# Patient Record
Sex: FEMALE | Race: WHITE | Employment: OTHER | URBAN - METROPOLITAN AREA
[De-identification: names, ages, dates, MRNs, and addresses within clinical notes are randomized per-mention and may not be internally consistent; named-entity substitution may affect disease eponyms.]

---

## 2018-06-11 ENCOUNTER — HOSPITAL ENCOUNTER (OUTPATIENT)
Dept: MAMMOGRAPHY | Age: 68
Discharge: HOME | End: 2018-06-11
Attending: FAMILY MEDICINE
Payer: MEDICARE

## 2018-06-11 DIAGNOSIS — Z12.31 VISIT FOR SCREENING MAMMOGRAM: ICD-10-CM

## 2018-06-11 DIAGNOSIS — E28.39 ESTROGEN DEFICIENCY: ICD-10-CM

## 2018-06-11 PROCEDURE — 77080 DXA BONE DENSITY AXIAL: CPT

## 2018-06-11 PROCEDURE — 77067 SCR MAMMO BI INCL CAD: CPT

## 2018-09-20 ENCOUNTER — HOSPITAL ENCOUNTER (OUTPATIENT)
Dept: MAMMOGRAPHY | Age: 68
Discharge: HOME | End: 2018-09-20
Attending: FAMILY MEDICINE
Payer: MEDICARE

## 2018-09-20 DIAGNOSIS — R92.8 ABNORMAL SCREENING MAMMOGRAM: ICD-10-CM

## 2018-09-20 PROCEDURE — 76642 ULTRASOUND BREAST LIMITED: CPT

## 2019-06-26 ENCOUNTER — HOSPITAL ENCOUNTER (OUTPATIENT)
Dept: CT IMAGING | Age: 69
Discharge: HOME | End: 2019-06-26
Attending: FAMILY MEDICINE
Payer: MEDICARE

## 2019-06-26 DIAGNOSIS — R41.3 MEMORY LOSS: ICD-10-CM

## 2019-06-26 PROCEDURE — 70450 CT HEAD/BRAIN W/O DYE: CPT

## 2019-07-10 PROBLEM — G30.0 EARLY ONSET ALZHEIMER'S DEMENTIA WITHOUT BEHAVIORAL DISTURBANCE (HCC): Status: ACTIVE | Noted: 2019-07-10

## 2019-07-10 PROBLEM — F02.80 EARLY ONSET ALZHEIMER'S DEMENTIA WITHOUT BEHAVIORAL DISTURBANCE (HCC): Status: ACTIVE | Noted: 2019-07-10

## 2019-07-10 PROBLEM — Z79.899 ENCOUNTER FOR MEDICATION MANAGEMENT: Status: ACTIVE | Noted: 2019-07-10

## 2020-02-07 PROBLEM — Z79.899 ENCOUNTER FOR MEDICATION MANAGEMENT: Status: RESOLVED | Noted: 2019-07-10 | Resolved: 2020-02-07

## 2020-10-27 PROBLEM — R21 RASH AND NONSPECIFIC SKIN ERUPTION: Status: ACTIVE | Noted: 2020-10-27

## 2022-03-19 PROBLEM — Z79.899 ENCOUNTER FOR MEDICATION MANAGEMENT: Status: ACTIVE | Noted: 2019-07-10

## 2022-03-19 PROBLEM — R21 RASH AND NONSPECIFIC SKIN ERUPTION: Status: ACTIVE | Noted: 2020-10-27

## 2022-03-19 PROBLEM — F02.80 EARLY ONSET ALZHEIMER'S DEMENTIA WITHOUT BEHAVIORAL DISTURBANCE (HCC): Status: ACTIVE | Noted: 2019-07-10

## 2022-03-19 PROBLEM — G30.0 EARLY ONSET ALZHEIMER'S DEMENTIA WITHOUT BEHAVIORAL DISTURBANCE (HCC): Status: ACTIVE | Noted: 2019-07-10

## 2022-10-05 DIAGNOSIS — F02.80 EARLY ONSET ALZHEIMER'S DEMENTIA WITHOUT BEHAVIORAL DISTURBANCE (HCC): Primary | ICD-10-CM

## 2022-10-05 DIAGNOSIS — G30.0 EARLY ONSET ALZHEIMER'S DEMENTIA WITHOUT BEHAVIORAL DISTURBANCE (HCC): Primary | ICD-10-CM

## 2022-10-06 RX ORDER — DONEPEZIL HYDROCHLORIDE 10 MG/1
10 TABLET, FILM COATED ORAL NIGHTLY
Qty: 90 TABLET | Refills: 2 | Status: SHIPPED | OUTPATIENT
Start: 2022-10-06

## 2022-10-06 RX ORDER — MEMANTINE HYDROCHLORIDE 10 MG/1
10 TABLET ORAL 2 TIMES DAILY
Qty: 180 TABLET | Refills: 2 | Status: SHIPPED | OUTPATIENT
Start: 2022-10-06

## 2022-11-23 ENCOUNTER — OFFICE VISIT (OUTPATIENT)
Dept: FAMILY MEDICINE CLINIC | Facility: CLINIC | Age: 72
End: 2022-11-23
Payer: MEDICARE

## 2022-11-23 VITALS
HEART RATE: 72 BPM | WEIGHT: 115.2 LBS | BODY MASS INDEX: 21.2 KG/M2 | SYSTOLIC BLOOD PRESSURE: 128 MMHG | DIASTOLIC BLOOD PRESSURE: 68 MMHG | OXYGEN SATURATION: 99 % | HEIGHT: 62 IN | RESPIRATION RATE: 18 BRPM

## 2022-11-23 DIAGNOSIS — R51.9 FRONTAL HEADACHE: ICD-10-CM

## 2022-11-23 DIAGNOSIS — G30.0 EARLY ONSET ALZHEIMER'S DEMENTIA WITHOUT BEHAVIORAL DISTURBANCE (HCC): ICD-10-CM

## 2022-11-23 DIAGNOSIS — R10.33 PERIUMBILICAL ABDOMINAL CRAMPING: ICD-10-CM

## 2022-11-23 DIAGNOSIS — R53.83 FATIGUE, UNSPECIFIED TYPE: Primary | ICD-10-CM

## 2022-11-23 DIAGNOSIS — F02.80 EARLY ONSET ALZHEIMER'S DEMENTIA WITHOUT BEHAVIORAL DISTURBANCE (HCC): ICD-10-CM

## 2022-11-23 LAB
25(OH)D3 SERPL-MCNC: 21.5 NG/ML (ref 30–100)
BASOPHILS # BLD: 0.1 K/UL (ref 0–0.2)
BASOPHILS NFR BLD: 1 % (ref 0–2)
BILIRUBIN, URINE, POC: NEGATIVE
BLOOD URINE, POC: NEGATIVE
DIFFERENTIAL METHOD BLD: ABNORMAL
EOSINOPHIL # BLD: 0 K/UL (ref 0–0.8)
EOSINOPHIL NFR BLD: 0 % (ref 0.5–7.8)
ERYTHROCYTE [DISTWIDTH] IN BLOOD BY AUTOMATED COUNT: 12.8 % (ref 11.9–14.6)
ERYTHROCYTE [SEDIMENTATION RATE] IN BLOOD: 2 MM/HR (ref 0–30)
GLUCOSE URINE, POC: NEGATIVE
HCT VFR BLD AUTO: 42.7 % (ref 35.8–46.3)
HGB BLD-MCNC: 13.9 G/DL (ref 11.7–15.4)
IMM GRANULOCYTES # BLD AUTO: 0 K/UL (ref 0–0.5)
IMM GRANULOCYTES NFR BLD AUTO: 0 % (ref 0–5)
KETONES, URINE, POC: NEGATIVE
LEUKOCYTE ESTERASE, URINE, POC: NEGATIVE
LYMPHOCYTES # BLD: 1.8 K/UL (ref 0.5–4.6)
LYMPHOCYTES NFR BLD: 23 % (ref 13–44)
MCH RBC QN AUTO: 33.3 PG (ref 26.1–32.9)
MCHC RBC AUTO-ENTMCNC: 32.6 G/DL (ref 31.4–35)
MCV RBC AUTO: 102.4 FL (ref 82–102)
MONOCYTES # BLD: 0.5 K/UL (ref 0.1–1.3)
MONOCYTES NFR BLD: 7 % (ref 4–12)
NEUTS SEG # BLD: 5.2 K/UL (ref 1.7–8.2)
NEUTS SEG NFR BLD: 69 % (ref 43–78)
NITRITE, URINE, POC: NEGATIVE
NRBC # BLD: 0 K/UL (ref 0–0.2)
PH, URINE, POC: 6 (ref 4.6–8)
PLATELET # BLD AUTO: 277 K/UL (ref 150–450)
PMV BLD AUTO: 9.1 FL (ref 9.4–12.3)
PROTEIN,URINE, POC: NEGATIVE
RBC # BLD AUTO: 4.17 M/UL (ref 4.05–5.2)
SPECIFIC GRAVITY, URINE, POC: 1.02 (ref 1–1.03)
T4 FREE SERPL-MCNC: 1 NG/DL (ref 0.78–1.46)
URINALYSIS CLARITY, POC: CLEAR
URINALYSIS COLOR, POC: YELLOW
UROBILINOGEN, POC: NORMAL
VIT B12 SERPL-MCNC: 598 PG/ML (ref 193–986)
WBC # BLD AUTO: 7.6 K/UL (ref 4.3–11.1)

## 2022-11-23 PROCEDURE — G8484 FLU IMMUNIZE NO ADMIN: HCPCS | Performed by: NURSE PRACTITIONER

## 2022-11-23 PROCEDURE — 1123F ACP DISCUSS/DSCN MKR DOCD: CPT | Performed by: NURSE PRACTITIONER

## 2022-11-23 PROCEDURE — 3017F COLORECTAL CA SCREEN DOC REV: CPT | Performed by: NURSE PRACTITIONER

## 2022-11-23 PROCEDURE — G8399 PT W/DXA RESULTS DOCUMENT: HCPCS | Performed by: NURSE PRACTITIONER

## 2022-11-23 PROCEDURE — 81003 URINALYSIS AUTO W/O SCOPE: CPT | Performed by: NURSE PRACTITIONER

## 2022-11-23 PROCEDURE — 1090F PRES/ABSN URINE INCON ASSESS: CPT | Performed by: NURSE PRACTITIONER

## 2022-11-23 PROCEDURE — G8420 CALC BMI NORM PARAMETERS: HCPCS | Performed by: NURSE PRACTITIONER

## 2022-11-23 PROCEDURE — 1036F TOBACCO NON-USER: CPT | Performed by: NURSE PRACTITIONER

## 2022-11-23 PROCEDURE — 99214 OFFICE O/P EST MOD 30 MIN: CPT | Performed by: NURSE PRACTITIONER

## 2022-11-23 PROCEDURE — G8427 DOCREV CUR MEDS BY ELIG CLIN: HCPCS | Performed by: NURSE PRACTITIONER

## 2022-11-23 RX ORDER — DICYCLOMINE HYDROCHLORIDE 10 MG/1
10 CAPSULE ORAL 3 TIMES DAILY PRN
Qty: 30 CAPSULE | Refills: 2 | Status: SHIPPED | OUTPATIENT
Start: 2022-11-23

## 2022-11-23 ASSESSMENT — ENCOUNTER SYMPTOMS
FACIAL SWELLING: 0
WHEEZING: 0
EYE PAIN: 0
EYE REDNESS: 0
SINUS PRESSURE: 0
DIARRHEA: 0
ABDOMINAL DISTENTION: 0
ALLERGIC/IMMUNOLOGIC NEGATIVE: 1
VOMITING: 0
CHEST TIGHTNESS: 0
EYES NEGATIVE: 1
APNEA: 0
COUGH: 0
CHOKING: 0
BACK PAIN: 0
CONSTIPATION: 0
RECTAL PAIN: 0
SHORTNESS OF BREATH: 0
STRIDOR: 0
EYE DISCHARGE: 0
COLOR CHANGE: 0
SINUS PAIN: 0
NAUSEA: 0
VOICE CHANGE: 0
SORE THROAT: 0
RESPIRATORY NEGATIVE: 1
ANAL BLEEDING: 0
PHOTOPHOBIA: 0
RHINORRHEA: 0
EYE ITCHING: 0
TROUBLE SWALLOWING: 0
BLOOD IN STOOL: 0

## 2022-11-23 NOTE — PROGRESS NOTES
[address]  Phone: [phone] Fax [phone]  Donnell Glover. Iris MS, APRN, FNP-C  11/23/22  Chief Complaint   Patient presents with    Fatigue     Pt has been lost to f/u with her PCP-Dr. King Gross since 10/28/20. Pt here today accompanied by her daughter and  who help take care of her. Pt has Alzheimer's disease. On Aricept and Namenda and under care of Neurology. 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 Reports pt has been having some ongoing fatigue for several weeks. Denies pt having any associated fever, chest pain or pressure, SOB, wheezing, cough. Abdominal Cramping     Also reports pt has been having ongoing periumbilical abdominal cramping for several weeks. Worse after pt eats. Denies pt having any N/V/D/loose stools, constipation, heartburn, reflux, fevers associated. Reports pt has good appetite. Denies any associated urinary symptoms. Last colonoscopy was 2013-due for repeat in near future. Headache     Also reports pt having ongoing issue with frequent frontal headaches for several months. Over past few weeks have been occurring several days a week. Takes PRN OTC Ibuprofen with relief per pt's . Denies pt having any blurred vision/photophobia, focal weakness or neuro defs, sinus/URI/allergy symptoms, or elevated BP associated. ASSESSMENT/PLAN:  Below is the assessment and plan developed based on review of pertinent history, physical exam, labs, studies, and medications. 1. Fatigue, unspecified type  Discussed with pt, daughter, . Complaints of fatigue vague. Will check CBC, CMP, TFT's, Vitamin B12, and Vitamin D level to start and address any abnormalities found. Pt to f/u in 2-3 weeks with her PCP-Dr. King Gross to re-establish care/recheck.  If labs unremarkable, Dr. King Gross may want to consider further workup, but fatigue may be related to pt's Alzheimer's disease.   - CBC with Auto Differential; Future  - Comprehensive Metabolic Panel; Future  - TSH; Future  - T4, Free; Future  - Vitamin B12; Future  - Vitamin D 25 Hydroxy; Future    2. Periumbilical abdominal cramping  Discussed with pt, daughter, . Abdomen benign on physical exam today. UA negative in office today and pt not having any urinary symptoms so doubt UTI. No abx unless urine culture grows something. Symptoms c/w possible IBS. Will have pt's  help pt keep food diary and have pt avoid trigger foods that may be found. Will have pt follow IBS diet. Will give PRN Bentyl as directed to hep with the periumbilical abdominal cramping. Will check CBC and CMP today. Will refer pt to GI for further evaluation and to discuss repeat colonoscopy. Pt to f/u in 2-3 weeks with her PCP-Dr. King Gross to re-establish care/recheck. - CBC with Auto Differential; Future  - Comprehensive Metabolic Panel; Future  - AMB POC URINALYSIS DIP STICK AUTO W/O MICRO  - Culture, Urine; Future  - Breanna Rodriguez MD, Gastroenterology, [place]  - dicyclomine (BENTYL) 10 MG capsule; Take 1 capsule by mouth 3 times daily as needed (periumbilcal cramping)  Dispense: 30 capsule; Refill: 2    3. Frontal headache  Discussed with pt, daughter, . BP WNL today 128/68. Etiology of headaches unclear, but since they are occurring more frequently, will check sed rate and order non-contrast head CT. Pt's  given number to radiology to schedule. Will have pt continue PRN OTC Ibuprofen as directed to help. Pt to f/u in 2-3 weeks with her PCP-Dr. King Gross to re-establish care/recheck. Pt's  encouraged to call Neurology to make an appointment to discuss frequent headaches as well. - Sedimentation Rate; Future  - CT HEAD WO CONTRAST; Future    4. Early onset Alzheimer's dementia without behavioral disturbance [facility])  Discussed with pt, daughter, .  Will have pt continue POC/medications for Alzheimer's disease per Neurology. Return for F/u 2-3 weeks with Dr. St Mention. Call sooner if worse. .  ER for severe symptoms as discussed. SUBJECTIVE/OBJECTIVE:    JOSH-  Andrew Marquis (: 1950) is a 81 y.o. female, Established patient patient, here for evaluation of the following chief complaint(s):  Fatigue (Pt has been lost to f/u with her PCP-Dr. St Mention since 10/28/20. Pt here today accompanied by her daughter and  who help take care of her. Pt has Alzheimer's disease. On Aricept and Namenda and under care of Neurology. 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 Reports pt has been having some ongoing fatigue for several weeks. Denies pt having any associated fever, chest pain or pressure, SOB, wheezing, cough. ), Abdominal Cramping (Also reports pt has been having ongoing periumbilical abdominal cramping for several weeks. Worse after pt eats. Denies pt having any N/V/D/loose stools, constipation, heartburn, reflux, fevers associated. Reports pt has good appetite. Denies any associated urinary symptoms. Last colonoscopy was -due for repeat in near future. ), and Headache (Also reports pt having ongoing issue with frequent frontal headaches for several months. Over past few weeks have been occurring several days a week. Takes PRN OTC Ibuprofen with relief per pt's .  Denies pt having any blurred vision/photophobia, focal weakness or neuro defs, sinus/URI/allergy symptoms, or elevated BP associated. )     No Known Allergies  [unfilled]  Past Medical History:   Diagnosis Date    H/O seasonal allergies      Past Surgical History:   Procedure Laterality Date    BREAST SURGERY      benign breast lump    HYSTERECTOMY (CERVIX STATUS UNKNOWN)      LSH/BSO    GENIA BIOPSY BREAST STEREOTACTIC Right 2004     Family History   Problem Relation Age of Onset    Hypertension Father 61    Elevated Lipids Father     Breast Cancer Neg Hx     Hypertension Mother     Elevated Lipids Mother     Elevated Lipids Maternal Grandmother      Social History     Tobacco Use   Smoking Status Never   Smokeless Tobacco Never         Review of Systems   Constitutional:  Positive for fatigue (see HPI). Negative for appetite change, chills, diaphoresis, fever and unexpected weight change. HENT: Negative. Negative for congestion, dental problem, drooling, ear discharge, ear pain, facial swelling, hearing loss, mouth sores, nosebleeds, postnasal drip, rhinorrhea, sinus pressure, sinus pain, sneezing, sore throat, tinnitus, trouble swallowing and voice change. Eyes: Negative. Negative for photophobia, pain, discharge, redness, itching and visual disturbance. Respiratory: Negative. Negative for apnea, cough, choking, chest tightness, shortness of breath, wheezing and stridor. Cardiovascular: Negative. Negative for chest pain, palpitations and leg swelling. Gastrointestinal:  Positive for abdominal pain (see HPI). Negative for abdominal distention, anal bleeding, blood in stool, constipation, diarrhea, nausea, rectal pain and vomiting. Endocrine: Negative. Negative for cold intolerance, heat intolerance, polydipsia, polyphagia and polyuria. Genitourinary: Negative. Negative for decreased urine volume, difficulty urinating, dyspareunia, dysuria, flank pain, frequency, genital sores, hematuria, menstrual problem, pelvic pain, urgency, vaginal bleeding, vaginal discharge and vaginal pain. Musculoskeletal: Negative. Negative for arthralgias, back pain, gait problem, joint swelling, myalgias, neck pain and neck stiffness. Skin: Negative. Negative for color change, pallor, rash and wound. Allergic/Immunologic: Negative. Negative for environmental allergies. Neurological:  Positive for headaches (see HPI). Negative for dizziness, tremors, seizures, syncope, facial asymmetry, speech difficulty, weakness, light-headedness and numbness. Hematological: Negative. Negative for adenopathy. Does not bruise/bleed easily. Psychiatric/Behavioral:  Positive for confusion (Alzheimer's Disease-STM loss-see HPI). Negative for agitation, behavioral problems, decreased concentration, dysphoric mood, hallucinations, self-injury, sleep disturbance and suicidal ideas. The patient is not nervous/anxious and is not hyperactive. Vitals:    11/23/22 0904   BP: 128/68   Pulse: 72   Resp: 18   SpO2: 99%       Physical Exam  Vitals reviewed. Constitutional:       General: She is not in acute distress. Appearance: Normal appearance. She is normal weight. She is not ill-appearing, toxic-appearing or diaphoretic. HENT:      Head: Normocephalic and atraumatic. Right Ear: Tympanic membrane, ear canal and external ear normal. There is no impacted cerumen. Left Ear: Tympanic membrane, ear canal and external ear normal. There is no impacted cerumen. Nose: Nose normal. No congestion or rhinorrhea. Mouth/Throat:      Mouth: Mucous membranes are moist.      Pharynx: Oropharynx is clear. No oropharyngeal exudate or posterior oropharyngeal erythema. Eyes:      General: No scleral icterus. Right eye: No discharge. Left eye: No discharge. Extraocular Movements: Extraocular movements intact. Conjunctiva/sclera: Conjunctivae normal.      Pupils: Pupils are equal, round, and reactive to light. Cardiovascular:      Rate and Rhythm: Normal rate and regular rhythm. Pulses: Normal pulses. Heart sounds: Normal heart sounds. No murmur heard. No friction rub. No gallop. Pulmonary:      Effort: Pulmonary effort is normal. No respiratory distress. Breath sounds: Normal breath sounds. No stridor. No wheezing, rhonchi or rales. Chest:      Chest wall: No tenderness. Abdominal:      General: Abdomen is flat. Bowel sounds are normal. There is no distension. Palpations: Abdomen is soft. There is no mass. Tenderness: There is no abdominal tenderness. There is no right CVA tenderness, left CVA tenderness, guarding or rebound. Hernia: No hernia is present. Musculoskeletal:         General: No swelling, tenderness, deformity or signs of injury. Normal range of motion. Cervical back: Normal range of motion and neck supple. No rigidity or tenderness. Right lower leg: No edema. Left lower leg: No edema. Comments: Gait steady and unassisted   Lymphadenopathy:      Cervical: No cervical adenopathy. Skin:     General: Skin is warm. Capillary Refill: Capillary refill takes less than 2 seconds. Coloration: Skin is not jaundiced or pale. Findings: No bruising, erythema, lesion or rash. Neurological:      General: No focal deficit present. Mental Status: She is alert. Cranial Nerves: No cranial nerve deficit. Sensory: No sensory deficit. Motor: No weakness. Coordination: Coordination normal.      Gait: Gait normal.      Comments: Mildly confused to time, but oriented to person and place. Easily redirected. Normal mood, behavior, responses   Psychiatric:         Mood and Affect: Mood normal.         Behavior: Behavior normal.         Thought Content:  Thought content normal.         Judgment: Judgment normal.     Component      Latest Ref Rng & Units 11/23/2022           9:32 AM   Color, Urine, POC       yellow   Clarity, Urine, POC       clear   Glucose, Urine, POC      Negative Negative   Bilirubin, Urine, POC      Negative Negative   Ketones, Urine, POC      Negative Negative   Specific Gravity, Urine, POC      1.001 - 1.035 1.025   Blood, Urine, POC      Negative negative   pH, Urine, POC      4.6 - 8.0 6.0   Protein, Urine, POC      Negative Negative   Urobilinogen, POC       normal   Nitrate, Urine, POC      Negative negative   Leukocyte Esterase, Urine, POC      Negative Negative     PLEASE NOTE:  This document has been produced using voice recognition software. Unrecognized errors in transcription may be present. On this date 11/23/2022 I have spent 30 minutes reviewing previous notes, test results and face to face with the patient discussing the diagnosis and importance of compliance with the treatment plan as well as documenting on the day of the visit. An electronic signature was used to authenticate this note.   -- Pramod Young, DEREK - NP

## 2022-11-24 LAB
ALBUMIN SERPL-MCNC: 4.1 G/DL (ref 3.2–4.6)
ALBUMIN/GLOB SERPL: 1.4 {RATIO} (ref 0.4–1.6)
ALP SERPL-CCNC: 104 U/L (ref 50–136)
ALT SERPL-CCNC: 50 U/L (ref 12–65)
ANION GAP SERPL CALC-SCNC: 4 MMOL/L (ref 2–11)
AST SERPL-CCNC: 32 U/L (ref 15–37)
BILIRUB SERPL-MCNC: 0.4 MG/DL (ref 0.2–1.1)
BUN SERPL-MCNC: 17 MG/DL (ref 8–23)
CALCIUM SERPL-MCNC: 10 MG/DL (ref 8.3–10.4)
CHLORIDE SERPL-SCNC: 108 MMOL/L (ref 101–110)
CO2 SERPL-SCNC: 29 MMOL/L (ref 21–32)
CREAT SERPL-MCNC: 0.6 MG/DL (ref 0.6–1)
GLOBULIN SER CALC-MCNC: 2.9 G/DL (ref 2.8–4.5)
GLUCOSE SERPL-MCNC: 91 MG/DL (ref 65–100)
POTASSIUM SERPL-SCNC: 4 MMOL/L (ref 3.5–5.1)
PROT SERPL-MCNC: 7 G/DL (ref 6.3–8.2)
SODIUM SERPL-SCNC: 141 MMOL/L (ref 133–143)
TSH, 3RD GENERATION: 1.51 UIU/ML (ref 0.36–3.74)

## 2022-11-25 DIAGNOSIS — E55.9 VITAMIN D INSUFFICIENCY: Primary | ICD-10-CM

## 2022-11-25 RX ORDER — CHOLECALCIFEROL (VITAMIN D3) 50 MCG
2000 TABLET ORAL DAILY
Qty: 30 TABLET | Refills: 2 | Status: SHIPPED | OUTPATIENT
Start: 2022-11-25

## 2022-11-25 ASSESSMENT — ENCOUNTER SYMPTOMS: ABDOMINAL PAIN: 1

## 2022-11-26 LAB
BACTERIA SPEC CULT: NORMAL
BACTERIA SPEC CULT: NORMAL
SERVICE CMNT-IMP: NORMAL

## 2022-12-07 ENCOUNTER — HOSPITAL ENCOUNTER (OUTPATIENT)
Dept: CT IMAGING | Age: 72
Discharge: HOME | End: 2022-12-10

## 2022-12-07 DIAGNOSIS — R51.9 FRONTAL HEADACHE: ICD-10-CM

## 2022-12-14 ENCOUNTER — OFFICE VISIT (OUTPATIENT)
Dept: FAMILY MEDICINE CLINIC | Facility: CLINIC | Age: 72
End: 2022-12-14
Payer: MEDICARE

## 2022-12-14 VITALS
SYSTOLIC BLOOD PRESSURE: 108 MMHG | HEART RATE: 70 BPM | TEMPERATURE: 98.3 F | DIASTOLIC BLOOD PRESSURE: 70 MMHG | OXYGEN SATURATION: 100 % | BODY MASS INDEX: 20.65 KG/M2 | HEIGHT: 62 IN | WEIGHT: 112.2 LBS

## 2022-12-14 DIAGNOSIS — E55.9 VITAMIN D DEFICIENCY: ICD-10-CM

## 2022-12-14 DIAGNOSIS — E55.9 VITAMIN D INSUFFICIENCY: ICD-10-CM

## 2022-12-14 DIAGNOSIS — R10.84 GENERALIZED ABDOMINAL PAIN: Primary | ICD-10-CM

## 2022-12-14 DIAGNOSIS — R53.83 FATIGUE, UNSPECIFIED TYPE: ICD-10-CM

## 2022-12-14 DIAGNOSIS — G30.0 EARLY ONSET ALZHEIMER'S DEMENTIA WITHOUT BEHAVIORAL DISTURBANCE (HCC): ICD-10-CM

## 2022-12-14 DIAGNOSIS — F02.80 EARLY ONSET ALZHEIMER'S DEMENTIA WITHOUT BEHAVIORAL DISTURBANCE (HCC): ICD-10-CM

## 2022-12-14 DIAGNOSIS — R51.9 HEADACHE, UNSPECIFIED HEADACHE TYPE: ICD-10-CM

## 2022-12-14 PROCEDURE — G8420 CALC BMI NORM PARAMETERS: HCPCS | Performed by: FAMILY MEDICINE

## 2022-12-14 PROCEDURE — 3017F COLORECTAL CA SCREEN DOC REV: CPT | Performed by: FAMILY MEDICINE

## 2022-12-14 PROCEDURE — 99215 OFFICE O/P EST HI 40 MIN: CPT | Performed by: FAMILY MEDICINE

## 2022-12-14 PROCEDURE — G8399 PT W/DXA RESULTS DOCUMENT: HCPCS | Performed by: FAMILY MEDICINE

## 2022-12-14 PROCEDURE — 1036F TOBACCO NON-USER: CPT | Performed by: FAMILY MEDICINE

## 2022-12-14 PROCEDURE — G8427 DOCREV CUR MEDS BY ELIG CLIN: HCPCS | Performed by: FAMILY MEDICINE

## 2022-12-14 PROCEDURE — 1090F PRES/ABSN URINE INCON ASSESS: CPT | Performed by: FAMILY MEDICINE

## 2022-12-14 PROCEDURE — 1123F ACP DISCUSS/DSCN MKR DOCD: CPT | Performed by: FAMILY MEDICINE

## 2022-12-14 PROCEDURE — G8484 FLU IMMUNIZE NO ADMIN: HCPCS | Performed by: FAMILY MEDICINE

## 2022-12-14 RX ORDER — CHOLECALCIFEROL (VITAMIN D3) 50 MCG
TABLET ORAL
Qty: 60 TABLET | Refills: 5 | Status: SHIPPED | OUTPATIENT
Start: 2022-12-14

## 2022-12-14 ASSESSMENT — PATIENT HEALTH QUESTIONNAIRE - PHQ9
1. LITTLE INTEREST OR PLEASURE IN DOING THINGS: 0
SUM OF ALL RESPONSES TO PHQ QUESTIONS 1-9: 0
SUM OF ALL RESPONSES TO PHQ QUESTIONS 1-9: 0
2. FEELING DOWN, DEPRESSED OR HOPELESS: 0
SUM OF ALL RESPONSES TO PHQ9 QUESTIONS 1 & 2: 0
SUM OF ALL RESPONSES TO PHQ QUESTIONS 1-9: 0
SUM OF ALL RESPONSES TO PHQ QUESTIONS 1-9: 0

## 2022-12-14 NOTE — PROGRESS NOTES
SUBJECTIVE:   Alisha Caballero is a 81 y.o. female who has a past medical history significant for Alzheimer's and vitamin D deficiency. I have not seen the patient in roughly 2 years. She saw our nurse practitioner in my absence recently. She was complaining of a 3-month history of generalized lower abdominal discomfort and bloating. A urinalysis and urine culture were negative. A CBC and metabolic panel were unremarkable. She was placed on Bentyl and referred to GI. She has been scheduled for a colonoscopy. Symptoms have subsided some. No reports of melena or hematochezia. No unexplained weight loss reported. No nausea or vomiting and no dysphagia or reflux symptoms. In addition she was complaining of a 3-month history of intermittent frontal headaches. No associated photophobia or nausea. Some sinus involvement reported and some tension/stress reported. No visual disturbance. No slurred speech. In the last 2 weeks these headaches have subsided. The nurse practitioner ordered a CT scan that revealed no acute findings. In addition the patient reported ongoing struggles with low energy and fatigue. No sleep disturbance reported. No chest pain, shortness of breath, orthopnea or PND. CBC, TSH, S07 and metabolic panel unremarkable. Vitamin D level mildly low at 21. HPI  See above    Past Medical History, Past Surgical History, Family history, Social History, and Medications were all reviewed with the patient today and updated as necessary.        Current Outpatient Medications   Medication Sig Dispense Refill    vitamin D (CHOLECALCIFEROL) 50 MCG (2000 UT) TABS tablet Take 1 tablet by mouth daily 30 tablet 2    dicyclomine (BENTYL) 10 MG capsule Take 1 capsule by mouth 3 times daily as needed (periumbilcal cramping) 30 capsule 2    donepezil (ARICEPT) 10 MG tablet Take 1 tablet by mouth nightly (Patient taking differently: Take 10 mg by mouth 2 times daily) 90 tablet 2    memantine (NAMENDA) 10 MG tablet Take 1 tablet by mouth 2 times daily 180 tablet 2     No current facility-administered medications for this visit. No Known Allergies  Patient Active Problem List   Diagnosis    Early onset Alzheimer's dementia without behavioral disturbance ([facility] [address]    Encounter for medication management    H/O seasonal allergies    Rash and nonspecific skin eruption    Vitamin D deficiency     Past Medical History:   Diagnosis Date    H/O seasonal allergies      Past Surgical History:   Procedure Laterality Date    BREAST SURGERY      benign breast lump    HYSTERECTOMY (CERVIX STATUS UNKNOWN)  2006    LSH/BSO    GENIA BIOPSY BREAST STEREOTACTIC Right 2004     Family History   Problem Relation Age of Onset    Hypertension Father 61    Elevated Lipids Father     Breast Cancer Neg Hx     Hypertension Mother     Elevated Lipids Mother     Elevated Lipids Maternal Grandmother      Social History     Tobacco Use    Smoking status: Never    Smokeless tobacco: Never   Substance Use Topics    Alcohol use: Yes     Alcohol/week: 7.0 standard drinks         Review of Systems  See above    OBJECTIVE:  /70   Pulse 70   Temp 98.3 °F (36.8 °C) (Oral)   Ht 5' 2\" (1.575 m)   Wt 112 lb 3.2 oz (50.9 kg)   SpO2 100%   BMI 20.52 kg/m²      Physical Exam  Constitutional:       General: She is not in acute distress. Appearance: Normal appearance. She is not ill-appearing. HENT:      Head: Normocephalic and atraumatic. Ears:      Comments: TMs are slightly dull bilaterally with clear effusions. Cardiovascular:      Rate and Rhythm: Normal rate and regular rhythm. Heart sounds: Normal heart sounds. No murmur heard. Pulmonary:      Effort: Pulmonary effort is normal.      Breath sounds: Normal breath sounds. No wheezing or rhonchi. Musculoskeletal:         General: Normal range of motion. Cervical back: Normal range of motion and neck supple. Right lower leg: No edema.       Left lower leg: No edema.   Skin:     General: Skin is warm. Findings: No rash. Neurological:      Mental Status: She is alert. Comments: Neurological exam is nonfocal.  Fundi benign. Psychiatric:         Mood and Affect: Mood normal.         Behavior: Behavior normal.         Thought Content: Thought content normal.         Judgment: Judgment normal.       Medical problems and test results were reviewed with the patient today. ASSESSMENT and PLAN    1. Lower abdominal pain. Is in the process of being evaluated by GI. Symptoms of actually improved some. Work-up thus far negative including labs. Follow-up as needed after GI work-up completed. 2.  Headaches. Seem to have resolved. Neuro imaging negative. Possible tension component versus allergic component. If recurs trial of over-the-counter plain Mucinex and Nasacort. 3.  Fatigue. Multifactorial.  Could be related to her Namenda and Aricept use. Could be related to her Alzheimer's. Vitamin D deficiency contributing as well. Will increase supplementation from 1 a day to 2 a day. Reevaluate at follow-up. 4.  Vitamin D deficiency. Increase supplementation from 1 a day to 2 a day. 5.  Alzheimer's. Per neurology. Elements of this note have been dictated using speech recognition software. As a result, errors of speech recognition may have occurred.

## 2023-03-13 ENCOUNTER — OFFICE VISIT (OUTPATIENT)
Dept: NEUROLOGY | Age: 73
End: 2023-03-13
Payer: MEDICARE

## 2023-03-13 VITALS
HEART RATE: 69 BPM | WEIGHT: 108 LBS | BODY MASS INDEX: 19.75 KG/M2 | SYSTOLIC BLOOD PRESSURE: 118 MMHG | DIASTOLIC BLOOD PRESSURE: 80 MMHG

## 2023-03-13 DIAGNOSIS — G30.0 EARLY ONSET ALZHEIMER'S DEMENTIA WITHOUT BEHAVIORAL DISTURBANCE (HCC): Primary | ICD-10-CM

## 2023-03-13 DIAGNOSIS — Z79.899 ENCOUNTER FOR MEDICATION MANAGEMENT: ICD-10-CM

## 2023-03-13 DIAGNOSIS — F02.80 EARLY ONSET ALZHEIMER'S DEMENTIA WITHOUT BEHAVIORAL DISTURBANCE (HCC): Primary | ICD-10-CM

## 2023-03-13 PROCEDURE — 1090F PRES/ABSN URINE INCON ASSESS: CPT | Performed by: PSYCHIATRY & NEUROLOGY

## 2023-03-13 PROCEDURE — 1036F TOBACCO NON-USER: CPT | Performed by: PSYCHIATRY & NEUROLOGY

## 2023-03-13 PROCEDURE — G8484 FLU IMMUNIZE NO ADMIN: HCPCS | Performed by: PSYCHIATRY & NEUROLOGY

## 2023-03-13 PROCEDURE — 99215 OFFICE O/P EST HI 40 MIN: CPT | Performed by: PSYCHIATRY & NEUROLOGY

## 2023-03-13 PROCEDURE — G8420 CALC BMI NORM PARAMETERS: HCPCS | Performed by: PSYCHIATRY & NEUROLOGY

## 2023-03-13 PROCEDURE — 3017F COLORECTAL CA SCREEN DOC REV: CPT | Performed by: PSYCHIATRY & NEUROLOGY

## 2023-03-13 PROCEDURE — G8399 PT W/DXA RESULTS DOCUMENT: HCPCS | Performed by: PSYCHIATRY & NEUROLOGY

## 2023-03-13 PROCEDURE — 1123F ACP DISCUSS/DSCN MKR DOCD: CPT | Performed by: PSYCHIATRY & NEUROLOGY

## 2023-03-13 PROCEDURE — G8427 DOCREV CUR MEDS BY ELIG CLIN: HCPCS | Performed by: PSYCHIATRY & NEUROLOGY

## 2023-03-13 ASSESSMENT — ENCOUNTER SYMPTOMS
EYE REDNESS: 0
DOUBLE VISION: 0
BACK PAIN: 1
BLURRED VISION: 0
PHOTOPHOBIA: 1
RESPIRATORY NEGATIVE: 1
GASTROINTESTINAL NEGATIVE: 1
EYE PAIN: 0
EYE DISCHARGE: 0

## 2023-03-13 ASSESSMENT — VISUAL ACUITY: OU: 1

## 2023-03-13 NOTE — PROGRESS NOTES
NEUROLOGY  RETURN  OFFICE VISIT [de-identified]                     3/13/2023  Cj Domingo is a 81 y.o. female here for [de-identified] Neurologic follow-up  // dementia. Referred by   Clemente Canales MD       Chief Complaint:   Dementia. Memory loss. Slowly progressive AD. She is on double drug therapy. 72-year-old woman accompanied  by her daughter and her . No particular changes. Continues with a slowly progressive memory disturbance dementia. Namenda at 10 mg twice daily. Aricept at 10 mg twice daily. Active Problems:    * No active hospital problems. *  Resolved Problems:    * No resolved hospital problems. *    Past Medical History:   Diagnosis Date    H/O seasonal allergies      Past Surgical History:   Procedure Laterality Date    BREAST SURGERY      benign breast lump    HYSTERECTOMY (CERVIX STATUS UNKNOWN)  2006    LSH/BSO    GENIA BIOPSY BREAST STEREOTACTIC Right 2004      Family History   Problem Relation Age of Onset    Hypertension Father [address]    Elevated Lipids Father     Breast Cancer Neg Hx     Hypertension Mother     Elevated Lipids Mother     Elevated Lipids Maternal Grandmother      Social History     Socioeconomic History    Marital status:      Spouse name: None    Number of children: None    Years of education: None    Highest education level: None   Tobacco Use    Smoking status: Never    Smokeless tobacco: Never   Substance and Sexual Activity    Alcohol use:  Yes     Alcohol/week: 7.0 standard drinks        Current Outpatient Medications   Medication Sig Dispense Refill    vitamin D (CHOLECALCIFEROL) 50 MCG (2000 UT) TABS tablet Take 1 tablet twice a day 60 tablet 5    dicyclomine (BENTYL) 10 MG capsule Take 1 capsule by mouth 3 times daily as needed (periumbilcal cramping) 30 capsule 2    donepezil (ARICEPT) 10 MG tablet Take 1 tablet by mouth nightly (Patient taking differently: Take 10 mg by mouth 2 times daily) 90 tablet 2    memantine (NAMENDA) 10 MG tablet Take 1 tablet by mouth 2 times daily 180 tablet 2     No current facility-administered medications for this visit. No Known Allergies    REVIEW OTHER RECORDS:    Review of Systems   Constitutional:  Positive for malaise/fatigue and weight loss. Negative for chills, diaphoresis and fever. HENT: Negative. Eyes:  Positive for photophobia. Negative for blurred vision, double vision, pain, discharge and redness. Respiratory: Negative. Cardiovascular: Negative. Gastrointestinal: Negative. Genitourinary: Negative. Musculoskeletal:  Positive for back pain. Negative for falls, joint pain, myalgias and neck pain. Skin: Negative. Neurological:  Positive for headaches. Negative for dizziness, tingling, tremors, sensory change, speech change, focal weakness, seizures, loss of consciousness and weakness. Gradual memory loss. Endo/Heme/Allergies: Negative. Psychiatric/Behavioral:  Positive for memory loss. Negative for depression, hallucinations, substance abuse and suicidal ideas. The patient is nervous/anxious. The patient does not have insomnia. All other systems reviewed and are negative. REVIEW IMAGING: none    Objective:     Vitals:    03/13/23 1158   BP: 118/80   Site: Right Upper Arm   Position: Sitting   Pulse: 69   Weight: 108 lb (49 kg)        Physical Exam  Vitals reviewed. Constitutional:       General: She is awake. She is not in acute distress. Appearance: She is well-developed and well-groomed. She is not ill-appearing, toxic-appearing or diaphoretic. HENT:      Head: Normocephalic and atraumatic. Right Ear: Hearing normal.      Left Ear: Hearing normal.   Eyes:      General: Lids are normal. Vision grossly intact. No scleral icterus. Right eye: No discharge. Left eye: No discharge. Extraocular Movements: Extraocular movements intact.       Conjunctiva/sclera: Conjunctivae normal.      Pupils: Pupils are equal, round, and reactive to light.   Neck:      Trachea: Phonation normal.   Pulmonary:      Effort: Pulmonary effort is normal. No respiratory distress. Breath sounds: No wheezing. Musculoskeletal:         General: No swelling, tenderness, deformity or signs of injury. Cervical back: Normal range of motion and neck supple. Right lower leg: No edema. Left lower leg: No edema. Skin:     Coloration: Skin is not cyanotic, jaundiced or pale. Nails: There is no clubbing. Neurological:      Mental Status: She is alert and easily aroused. Mental status is at baseline. Cranial Nerves: Cranial nerves 2-12 are intact. No cranial nerve deficit, dysarthria or facial asymmetry. Motor: No weakness, tremor, atrophy or seizure activity. Coordination: Coordination normal.      Gait: Gait normal.   Psychiatric:         Attention and Perception: Attention normal.         Mood and Affect: Mood normal.         Speech: Speech normal.         Behavior: Behavior normal. Behavior is cooperative. Neurologic Exam     Mental Status   Speech: speech is normal   Level of consciousness: alert  Normal comprehension. Cranial Nerves   Cranial nerves II through XII intact. CN III, IV, VI   Pupils are equal, round, and reactive to light. Gait, Coordination, and Reflexes     Tremor   Resting tremor: absent  Intention tremor: absent  Action tremor: absent  There is no tic, twitch, tonic or clonic activity noted. No dyskinesia. Assessment & Plan     Boykin Burkitt was seen today for follow-up. Diagnoses and all orders for this visit:    Early onset Alzheimer's dementia without behavioral disturbance ([facility] [address]    Encounter for medication management    Continues as expected with decline in memory and forgetfulness increasing. Continue double drug therapy. Had a full discussion and we can try to line her up for second opinion if they wish.     All drugs and rx reviewed fully with patient and acknowledged specific to neurology as well. Differential diagnostic decisions and thoughts reviewed and discussed. Updating to patient identification, coordinate neurology visits, reasons for follow, review neurologic problems. Extensive time[de-identified]  Total time  40 minutes -       More than 50% of this visit was spent in counseling and care coordination. Treatment options fully reviewed with patient. Time includes pre-  and post- face-face time in records review, and preparation including available pertinent images and reports. Acknowledgements obtained where needed.    [ *NOTE: parts of this note were produced using artificial speech recognition software, which may have inadvertent errors in the produced wordings. ]          Alfonzo Guevara MD  Consultative Neurology, [address] [facility] [place]  Phone:  [phone]  Fax:   [phone]        Signed By: Alfonzo Guevara MD     March 13, 2023

## 2023-03-15 ENCOUNTER — TELEPHONE (OUTPATIENT)
Dept: NEUROLOGY | Age: 73
End: 2023-03-15

## 2023-03-15 DIAGNOSIS — R41.3 MEMORY LOSS OR IMPAIRMENT: Primary | ICD-10-CM

## 2023-04-09 DIAGNOSIS — G30.0 EARLY ONSET ALZHEIMER'S DEMENTIA WITHOUT BEHAVIORAL DISTURBANCE (HCC): Primary | ICD-10-CM

## 2023-04-09 DIAGNOSIS — F02.80 EARLY ONSET ALZHEIMER'S DEMENTIA WITHOUT BEHAVIORAL DISTURBANCE (HCC): Primary | ICD-10-CM

## 2023-04-11 RX ORDER — DONEPEZIL HYDROCHLORIDE 10 MG/1
TABLET, FILM COATED ORAL
Qty: 90 TABLET | Refills: 2 | OUTPATIENT
Start: 2023-04-11

## 2023-04-11 RX ORDER — DONEPEZIL HYDROCHLORIDE 10 MG/1
10 TABLET, FILM COATED ORAL NIGHTLY
Qty: 90 TABLET | Refills: 2 | Status: SHIPPED | OUTPATIENT
Start: 2023-04-11 | End: 2023-04-11

## 2023-06-19 ENCOUNTER — OFFICE VISIT (OUTPATIENT)
Dept: FAMILY MEDICINE CLINIC | Facility: CLINIC | Age: 73
End: 2023-06-19
Payer: MEDICARE

## 2023-06-19 VITALS
DIASTOLIC BLOOD PRESSURE: 76 MMHG | HEIGHT: 62 IN | BODY MASS INDEX: 19.32 KG/M2 | SYSTOLIC BLOOD PRESSURE: 98 MMHG | WEIGHT: 105 LBS

## 2023-06-19 DIAGNOSIS — M54.50 CHRONIC MIDLINE LOW BACK PAIN WITHOUT SCIATICA: ICD-10-CM

## 2023-06-19 DIAGNOSIS — R35.0 FREQUENCY OF MICTURITION: ICD-10-CM

## 2023-06-19 DIAGNOSIS — Z00.00 MEDICARE ANNUAL WELLNESS VISIT, SUBSEQUENT: Primary | ICD-10-CM

## 2023-06-19 DIAGNOSIS — E55.9 VITAMIN D DEFICIENCY: ICD-10-CM

## 2023-06-19 DIAGNOSIS — M25.562 CHRONIC PAIN OF BOTH KNEES: ICD-10-CM

## 2023-06-19 DIAGNOSIS — G30.0 EARLY ONSET ALZHEIMER'S DEMENTIA WITHOUT BEHAVIORAL DISTURBANCE (HCC): ICD-10-CM

## 2023-06-19 DIAGNOSIS — F02.80 EARLY ONSET ALZHEIMER'S DEMENTIA WITHOUT BEHAVIORAL DISTURBANCE (HCC): ICD-10-CM

## 2023-06-19 DIAGNOSIS — M25.561 CHRONIC PAIN OF BOTH KNEES: ICD-10-CM

## 2023-06-19 DIAGNOSIS — E78.00 PURE HYPERCHOLESTEROLEMIA: ICD-10-CM

## 2023-06-19 DIAGNOSIS — G89.29 CHRONIC PAIN OF BOTH KNEES: ICD-10-CM

## 2023-06-19 DIAGNOSIS — G89.29 CHRONIC MIDLINE LOW BACK PAIN WITHOUT SCIATICA: ICD-10-CM

## 2023-06-19 DIAGNOSIS — Z78.0 MENOPAUSE: ICD-10-CM

## 2023-06-19 DIAGNOSIS — F33.9 RECURRENT DEPRESSION (HCC): ICD-10-CM

## 2023-06-19 LAB
BASOPHILS # BLD: 0.1 K/UL (ref 0–0.2)
BASOPHILS NFR BLD: 1 % (ref 0–2)
BILIRUBIN, URINE, POC: NEGATIVE
BLOOD URINE, POC: NEGATIVE
DIFFERENTIAL METHOD BLD: ABNORMAL
EOSINOPHIL # BLD: 0.1 K/UL (ref 0–0.8)
EOSINOPHIL NFR BLD: 1 % (ref 0.5–7.8)
ERYTHROCYTE [DISTWIDTH] IN BLOOD BY AUTOMATED COUNT: 13 % (ref 11.9–14.6)
GLUCOSE URINE, POC: NEGATIVE
HCT VFR BLD AUTO: 42.3 % (ref 35.8–46.3)
HGB BLD-MCNC: 13.3 G/DL (ref 11.7–15.4)
IMM GRANULOCYTES # BLD AUTO: 0 K/UL (ref 0–0.5)
IMM GRANULOCYTES NFR BLD AUTO: 0 % (ref 0–5)
KETONES, URINE, POC: NEGATIVE
LEUKOCYTE ESTERASE, URINE, POC: NEGATIVE
LYMPHOCYTES # BLD: 2.3 K/UL (ref 0.5–4.6)
LYMPHOCYTES NFR BLD: 23 % (ref 13–44)
MCH RBC QN AUTO: 32.4 PG (ref 26.1–32.9)
MCHC RBC AUTO-ENTMCNC: 31.4 G/DL (ref 31.4–35)
MCV RBC AUTO: 102.9 FL (ref 82–102)
MONOCYTES # BLD: 0.6 K/UL (ref 0.1–1.3)
MONOCYTES NFR BLD: 6 % (ref 4–12)
NEUTS SEG # BLD: 6.7 K/UL (ref 1.7–8.2)
NEUTS SEG NFR BLD: 69 % (ref 43–78)
NITRITE, URINE, POC: NEGATIVE
NRBC # BLD: 0 K/UL (ref 0–0.2)
PH, URINE, POC: 7 (ref 4.6–8)
PLATELET # BLD AUTO: 262 K/UL (ref 150–450)
PMV BLD AUTO: 9.5 FL (ref 9.4–12.3)
PROTEIN,URINE, POC: NEGATIVE
RBC # BLD AUTO: 4.11 M/UL (ref 4.05–5.2)
SPECIFIC GRAVITY, URINE, POC: 1.01 (ref 1–1.03)
URINALYSIS CLARITY, POC: NORMAL
URINALYSIS COLOR, POC: YELLOW
UROBILINOGEN, POC: NORMAL
WBC # BLD AUTO: 9.7 K/UL (ref 4.3–11.1)

## 2023-06-19 PROCEDURE — G8420 CALC BMI NORM PARAMETERS: HCPCS | Performed by: FAMILY MEDICINE

## 2023-06-19 PROCEDURE — 3017F COLORECTAL CA SCREEN DOC REV: CPT | Performed by: FAMILY MEDICINE

## 2023-06-19 PROCEDURE — G8399 PT W/DXA RESULTS DOCUMENT: HCPCS | Performed by: FAMILY MEDICINE

## 2023-06-19 PROCEDURE — 99214 OFFICE O/P EST MOD 30 MIN: CPT | Performed by: FAMILY MEDICINE

## 2023-06-19 PROCEDURE — G8427 DOCREV CUR MEDS BY ELIG CLIN: HCPCS | Performed by: FAMILY MEDICINE

## 2023-06-19 PROCEDURE — 1036F TOBACCO NON-USER: CPT | Performed by: FAMILY MEDICINE

## 2023-06-19 PROCEDURE — 81003 URINALYSIS AUTO W/O SCOPE: CPT | Performed by: FAMILY MEDICINE

## 2023-06-19 PROCEDURE — G0439 PPPS, SUBSEQ VISIT: HCPCS | Performed by: FAMILY MEDICINE

## 2023-06-19 PROCEDURE — 1123F ACP DISCUSS/DSCN MKR DOCD: CPT | Performed by: FAMILY MEDICINE

## 2023-06-19 PROCEDURE — 1090F PRES/ABSN URINE INCON ASSESS: CPT | Performed by: FAMILY MEDICINE

## 2023-06-19 RX ORDER — MELOXICAM 7.5 MG/1
7.5 TABLET ORAL DAILY
Qty: 30 TABLET | Refills: 3 | Status: SHIPPED | OUTPATIENT
Start: 2023-06-19

## 2023-06-19 RX ORDER — DULOXETIN HYDROCHLORIDE 30 MG/1
30 CAPSULE, DELAYED RELEASE ORAL DAILY
Qty: 30 CAPSULE | Refills: 5 | Status: SHIPPED | OUTPATIENT
Start: 2023-06-19

## 2023-06-19 SDOH — ECONOMIC STABILITY: FOOD INSECURITY: WITHIN THE PAST 12 MONTHS, THE FOOD YOU BOUGHT JUST DIDN'T LAST AND YOU DIDN'T HAVE MONEY TO GET MORE.: PATIENT DECLINED

## 2023-06-19 SDOH — ECONOMIC STABILITY: FOOD INSECURITY: WITHIN THE PAST 12 MONTHS, YOU WORRIED THAT YOUR FOOD WOULD RUN OUT BEFORE YOU GOT MONEY TO BUY MORE.: PATIENT DECLINED

## 2023-06-19 SDOH — ECONOMIC STABILITY: HOUSING INSECURITY
IN THE LAST 12 MONTHS, WAS THERE A TIME WHEN YOU DID NOT HAVE A STEADY PLACE TO SLEEP OR SLEPT IN A SHELTER (INCLUDING NOW)?: PATIENT REFUSED

## 2023-06-19 SDOH — ECONOMIC STABILITY: INCOME INSECURITY: HOW HARD IS IT FOR YOU TO PAY FOR THE VERY BASICS LIKE FOOD, HOUSING, MEDICAL CARE, AND HEATING?: PATIENT DECLINED

## 2023-06-19 SDOH — HEALTH STABILITY: PHYSICAL HEALTH: ON AVERAGE, HOW MANY DAYS PER WEEK DO YOU ENGAGE IN MODERATE TO STRENUOUS EXERCISE (LIKE A BRISK WALK)?: 0 DAYS

## 2023-06-19 SDOH — ECONOMIC STABILITY: TRANSPORTATION INSECURITY
IN THE PAST 12 MONTHS, HAS LACK OF TRANSPORTATION KEPT YOU FROM MEETINGS, WORK, OR FROM GETTING THINGS NEEDED FOR DAILY LIVING?: PATIENT DECLINED

## 2023-06-19 ASSESSMENT — PATIENT HEALTH QUESTIONNAIRE - PHQ9
SUM OF ALL RESPONSES TO PHQ QUESTIONS 1-9: 11
10. IF YOU CHECKED OFF ANY PROBLEMS, HOW DIFFICULT HAVE THESE PROBLEMS MADE IT FOR YOU TO DO YOUR WORK, TAKE CARE OF THINGS AT HOME, OR GET ALONG WITH OTHER PEOPLE: 1
2. FEELING DOWN, DEPRESSED OR HOPELESS: 1
1. LITTLE INTEREST OR PLEASURE IN DOING THINGS: 2
6. FEELING BAD ABOUT YOURSELF - OR THAT YOU ARE A FAILURE OR HAVE LET YOURSELF OR YOUR FAMILY DOWN: 2
9. THOUGHTS THAT YOU WOULD BE BETTER OFF DEAD, OR OF HURTING YOURSELF: 0
7. TROUBLE CONCENTRATING ON THINGS, SUCH AS READING THE NEWSPAPER OR WATCHING TELEVISION: 2
5. POOR APPETITE OR OVEREATING: 1
SUM OF ALL RESPONSES TO PHQ QUESTIONS 1-9: 11
3. TROUBLE FALLING OR STAYING ASLEEP: 2
SUM OF ALL RESPONSES TO PHQ9 QUESTIONS 1 & 2: 3
8. MOVING OR SPEAKING SO SLOWLY THAT OTHER PEOPLE COULD HAVE NOTICED. OR THE OPPOSITE, BEING SO FIGETY OR RESTLESS THAT YOU HAVE BEEN MOVING AROUND A LOT MORE THAN USUAL: 0
4. FEELING TIRED OR HAVING LITTLE ENERGY: 1

## 2023-06-19 ASSESSMENT — LIFESTYLE VARIABLES
HOW OFTEN DO YOU HAVE SIX OR MORE DRINKS ON ONE OCCASION: 98
HOW MANY STANDARD DRINKS CONTAINING ALCOHOL DO YOU HAVE ON A TYPICAL DAY: 98
HOW MANY STANDARD DRINKS CONTAINING ALCOHOL DO YOU HAVE ON A TYPICAL DAY: PATIENT DECLINED
HOW OFTEN DO YOU HAVE A DRINK CONTAINING ALCOHOL: NEVER
HOW OFTEN DO YOU HAVE A DRINK CONTAINING ALCOHOL: 1

## 2023-06-19 NOTE — PROGRESS NOTES
Medicare Annual Wellness Visit    Terri Colón is here for Medicare AWV (subs)    Assessment & Plan   Medicare annual wellness visit, subsequent  Recommendations for Preventive Services Due: see orders and patient instructions/AVS.  Recommended screening schedule for the next 5-10 years is provided to the patient in written form: see Patient Instructions/AVS.     No follow-ups on file. Subjective   Alzheimer's dementia, vitamin D deficiency and high cholesterol    Patient's complete Health Risk Assessment and screening values have been reviewed and are found in Flowsheets. The following problems were reviewed today and where indicated follow up appointments were made and/or referrals ordered. Positive Risk Factor Screenings with Interventions:    Fall Risk:  Do you feel unsteady or are you worried about falling? : (!) yes  2 or more falls in past year?: no  Fall with injury in past year?: no     Interventions:    Fall precautions given    Cognitive: Words recalled: 0 Words Recalled           Total Score Interpretation: Abnormal Mini-Cog      Interventions:  Patient is followed by neurology for Alzheimer's dementia    Depression:  PHQ-2 Score: 3  PHQ-9 Total Score: 11    Interpretation:   1-4 = minimal  5-9 = mild  10-14 = moderate  15-19 = moderately severe  20-27 = severe  Interventions:  Discussed with family treatment options including counseling and pharmacologic intervention. Family would like pharmacologic intervention. Start Cymbalta 60 mg a day. Reevaluate in 4 to 6 weeks. General HRA Questions:  Select all that apply: (!) New or Increased Pain, New or Increased Fatigue    Pain Interventions:  Patient has degenerative arthritis in hips and back. Trial of Mobic 7.5 mg a day. Fatigue Interventions:  Treat underlying depression. Check CBC, TSH and metabolic panel.            Safety:  Do you have either shower bars, grab bars, non-slip mats or non-slip surfaces in your shower or
and nonspecific skin eruption    Vitamin D deficiency     Past Medical History:   Diagnosis Date    H/O seasonal allergies      Past Surgical History:   Procedure Laterality Date    BREAST SURGERY      benign breast lump    HYSTERECTOMY (CERVIX STATUS UNKNOWN)  2006    LSH/BSO    GENIA BIOPSY BREAST STEREOTACTIC Right 2004     Family History   Problem Relation Age of Onset    Hypertension Father 61    Elevated Lipids Father     Breast Cancer Neg Hx     Hypertension Mother     Elevated Lipids Mother     Elevated Lipids Maternal Grandmother      Social History     Tobacco Use    Smoking status: Never    Smokeless tobacco: Never   Substance Use Topics    Alcohol use: Yes     Alcohol/week: 7.0 standard drinks         Review of Systems  See above    OBJECTIVE:  BP 98/76   Ht 5' 2\" (1.575 m)   Wt 105 lb (47.6 kg)   BMI 19.20 kg/m²      Physical Exam  Constitutional:       General: She is not in acute distress. Appearance: Normal appearance. She is not ill-appearing. HENT:      Head: Normocephalic and atraumatic. Right Ear: Ear canal and external ear normal. There is no impacted cerumen. Left Ear: Tympanic membrane, ear canal and external ear normal. There is no impacted cerumen. Nose: Nose normal.      Mouth/Throat:      Mouth: Mucous membranes are moist.      Pharynx: Oropharynx is clear. No oropharyngeal exudate or posterior oropharyngeal erythema. Eyes:      General: No scleral icterus. Extraocular Movements: Extraocular movements intact. Conjunctiva/sclera: Conjunctivae normal.      Pupils: Pupils are equal, round, and reactive to light. Neck:      Vascular: No carotid bruit. Cardiovascular:      Rate and Rhythm: Normal rate and regular rhythm. Pulses: Normal pulses. Heart sounds: Normal heart sounds. No murmur heard. Pulmonary:      Effort: Pulmonary effort is normal. No respiratory distress. Breath sounds: Normal breath sounds. No wheezing.    Abdominal:

## 2023-06-20 DIAGNOSIS — E83.52 HYPERCALCEMIA: Primary | ICD-10-CM

## 2023-06-20 LAB
25(OH)D3 SERPL-MCNC: 48.3 NG/ML (ref 30–100)
ALBUMIN SERPL-MCNC: 4.3 G/DL (ref 3.2–4.6)
ALBUMIN/GLOB SERPL: 1.5 (ref 0.4–1.6)
ALP SERPL-CCNC: 95 U/L (ref 50–136)
ALT SERPL-CCNC: 30 U/L (ref 12–65)
ANION GAP SERPL CALC-SCNC: 3 MMOL/L (ref 2–11)
AST SERPL-CCNC: 19 U/L (ref 15–37)
BILIRUB SERPL-MCNC: 0.5 MG/DL (ref 0.2–1.1)
BUN SERPL-MCNC: 23 MG/DL (ref 8–23)
CALCIUM SERPL-MCNC: 11.6 MG/DL (ref 8.3–10.4)
CHLORIDE SERPL-SCNC: 108 MMOL/L (ref 101–110)
CHOLEST SERPL-MCNC: 217 MG/DL
CO2 SERPL-SCNC: 31 MMOL/L (ref 21–32)
CREAT SERPL-MCNC: 0.6 MG/DL (ref 0.6–1)
GLOBULIN SER CALC-MCNC: 2.9 G/DL (ref 2.8–4.5)
GLUCOSE SERPL-MCNC: 82 MG/DL (ref 65–100)
HDLC SERPL-MCNC: 72 MG/DL (ref 40–60)
HDLC SERPL: 3
LDLC SERPL CALC-MCNC: 122 MG/DL
POTASSIUM SERPL-SCNC: 4.1 MMOL/L (ref 3.5–5.1)
PROT SERPL-MCNC: 7.2 G/DL (ref 6.3–8.2)
SODIUM SERPL-SCNC: 142 MMOL/L (ref 133–143)
TRIGL SERPL-MCNC: 115 MG/DL (ref 35–150)
TSH, 3RD GENERATION: 1.34 UIU/ML (ref 0.36–3.74)
VLDLC SERPL CALC-MCNC: 23 MG/DL (ref 6–23)

## 2023-06-22 LAB
BACTERIA SPEC CULT: NORMAL
SERVICE CMNT-IMP: NORMAL

## 2023-07-05 ENCOUNTER — HOSPITAL ENCOUNTER (OUTPATIENT)
Dept: MAMMOGRAPHY | Age: 73
Discharge: HOME | End: 2023-07-08
Attending: FAMILY MEDICINE
Payer: MEDICARE

## 2023-07-05 DIAGNOSIS — Z78.0 MENOPAUSE: ICD-10-CM

## 2023-07-05 PROCEDURE — 77080 DXA BONE DENSITY AXIAL: CPT

## 2023-07-11 DIAGNOSIS — F02.80 EARLY ONSET ALZHEIMER'S DEMENTIA WITHOUT BEHAVIORAL DISTURBANCE (HCC): ICD-10-CM

## 2023-07-11 DIAGNOSIS — G30.0 EARLY ONSET ALZHEIMER'S DEMENTIA WITHOUT BEHAVIORAL DISTURBANCE (HCC): ICD-10-CM

## 2023-07-17 DIAGNOSIS — E55.9 VITAMIN D INSUFFICIENCY: ICD-10-CM

## 2023-07-17 RX ORDER — DM/PE/ACETAMINOPHEN/CHLORPHENR 10-5-325-2
TABLET, SEQUENTIAL ORAL
Qty: 60 TABLET | Refills: 5 | Status: SHIPPED | OUTPATIENT
Start: 2023-07-17

## 2023-07-27 RX ORDER — MEMANTINE HYDROCHLORIDE 10 MG/1
TABLET ORAL
Qty: 180 TABLET | Refills: 2 | OUTPATIENT
Start: 2023-07-27

## 2023-08-07 ENCOUNTER — TELEMEDICINE (OUTPATIENT)
Dept: FAMILY MEDICINE CLINIC | Facility: CLINIC | Age: 73
End: 2023-08-07
Payer: MEDICARE

## 2023-08-07 DIAGNOSIS — G30.0 EARLY ONSET ALZHEIMER'S DEMENTIA WITHOUT BEHAVIORAL DISTURBANCE (HCC): ICD-10-CM

## 2023-08-07 DIAGNOSIS — F33.0 MAJOR DEPRESSIVE DISORDER, RECURRENT, MILD (HCC): ICD-10-CM

## 2023-08-07 DIAGNOSIS — R35.0 FREQUENCY OF MICTURITION: Primary | ICD-10-CM

## 2023-08-07 DIAGNOSIS — E78.00 PURE HYPERCHOLESTEROLEMIA: ICD-10-CM

## 2023-08-07 DIAGNOSIS — F02.80 EARLY ONSET ALZHEIMER'S DEMENTIA WITHOUT BEHAVIORAL DISTURBANCE (HCC): ICD-10-CM

## 2023-08-07 DIAGNOSIS — M81.0 AGE-RELATED OSTEOPOROSIS WITHOUT CURRENT PATHOLOGICAL FRACTURE: ICD-10-CM

## 2023-08-07 DIAGNOSIS — E55.9 VITAMIN D DEFICIENCY: ICD-10-CM

## 2023-08-07 DIAGNOSIS — G89.29 CHRONIC PAIN OF BOTH KNEES: ICD-10-CM

## 2023-08-07 DIAGNOSIS — M25.562 CHRONIC PAIN OF BOTH KNEES: ICD-10-CM

## 2023-08-07 DIAGNOSIS — M25.561 CHRONIC PAIN OF BOTH KNEES: ICD-10-CM

## 2023-08-07 DIAGNOSIS — E83.52 HYPERCALCEMIA: ICD-10-CM

## 2023-08-07 PROCEDURE — 99443 PR PHYS/QHP TELEPHONE EVALUATION 21-30 MIN: CPT | Performed by: FAMILY MEDICINE

## 2023-08-07 SDOH — ECONOMIC STABILITY: HOUSING INSECURITY
IN THE LAST 12 MONTHS, WAS THERE A TIME WHEN YOU DID NOT HAVE A STEADY PLACE TO SLEEP OR SLEPT IN A SHELTER (INCLUDING NOW)?: NO

## 2023-08-07 SDOH — ECONOMIC STABILITY: FOOD INSECURITY: WITHIN THE PAST 12 MONTHS, THE FOOD YOU BOUGHT JUST DIDN'T LAST AND YOU DIDN'T HAVE MONEY TO GET MORE.: NEVER TRUE

## 2023-08-07 SDOH — ECONOMIC STABILITY: INCOME INSECURITY: HOW HARD IS IT FOR YOU TO PAY FOR THE VERY BASICS LIKE FOOD, HOUSING, MEDICAL CARE, AND HEATING?: NOT HARD AT ALL

## 2023-08-07 SDOH — ECONOMIC STABILITY: FOOD INSECURITY: WITHIN THE PAST 12 MONTHS, YOU WORRIED THAT YOUR FOOD WOULD RUN OUT BEFORE YOU GOT MONEY TO BUY MORE.: NEVER TRUE

## 2023-08-07 NOTE — PROGRESS NOTES
SUBJECTIVE:   Elliott Woods is a 72 y.o. female who has a past medical history significant for Alzheimer's dementia, vitamin D deficiency and high cholesterol. Patient is assisted today by her . Virtual visit obtained through telephone encounter. No active complaints of chest pain, shortness of breath, orthopnea or PND. GI and  review of systems is unremarkable. Mobic has helped her knee pain. Cymbalta was added for her depression which is helping. Patient was referred for bone density which revealed osteoporosis and a corresponding 8.4% loss of bone compared to her prior study. Lastly, the patient was complaining of some frequency. Urine culture and urinalysis were unremarkable. The symptoms seem to have subsided. Patient's vital signs were not performed as encounter was performed virtually. Location of virtual visit was patient's home. HPI  See above    Past Medical History, Past Surgical History, Family history, Social History, and Medications were all reviewed with the patient today and updated as necessary. Current Outpatient Medications   Medication Sig Dispense Refill    Cholecalciferol (GNP VITAMIN D MAXIMUM STRENGTH) 50 MCG (2000 UT) TABS TAKE ONE TABLET BY MOUTH TWICE A DAY 60 tablet 5    meloxicam (MOBIC) 7.5 MG tablet Take 1 tablet by mouth daily 30 tablet 3    DULoxetine (CYMBALTA) 30 MG extended release capsule Take 1 capsule by mouth daily 30 capsule 5    donepezil (ARICEPT) 10 MG tablet Take 1 tablet by mouth nightly 90 tablet 2    memantine (NAMENDA) 10 MG tablet Take 1 tablet by mouth 2 times daily 180 tablet 2     No current facility-administered medications for this visit.      No Known Allergies  Patient Active Problem List   Diagnosis    Early onset Alzheimer's dementia without behavioral disturbance ([address])    Encounter for medication management    H/O seasonal allergies    Rash and nonspecific skin eruption    Vitamin D deficiency     Past Medical

## 2023-08-30 DIAGNOSIS — G30.0 EARLY ONSET ALZHEIMER'S DEMENTIA WITHOUT BEHAVIORAL DISTURBANCE (HCC): ICD-10-CM

## 2023-08-30 DIAGNOSIS — F02.80 EARLY ONSET ALZHEIMER'S DEMENTIA WITHOUT BEHAVIORAL DISTURBANCE (HCC): ICD-10-CM

## 2023-09-05 DIAGNOSIS — G30.0 EARLY ONSET ALZHEIMER'S DEMENTIA WITHOUT BEHAVIORAL DISTURBANCE (HCC): ICD-10-CM

## 2023-09-05 DIAGNOSIS — F02.80 EARLY ONSET ALZHEIMER'S DEMENTIA WITHOUT BEHAVIORAL DISTURBANCE (HCC): ICD-10-CM

## 2023-09-07 ENCOUNTER — TELEPHONE (OUTPATIENT)
Dept: FAMILY MEDICINE CLINIC | Facility: CLINIC | Age: 73
End: 2023-09-07

## 2023-09-07 DIAGNOSIS — G30.0 EARLY ONSET ALZHEIMER'S DEMENTIA WITHOUT BEHAVIORAL DISTURBANCE (HCC): ICD-10-CM

## 2023-09-07 DIAGNOSIS — F02.80 EARLY ONSET ALZHEIMER'S DEMENTIA WITHOUT BEHAVIORAL DISTURBANCE (HCC): ICD-10-CM

## 2023-09-07 RX ORDER — MEMANTINE HYDROCHLORIDE 10 MG/1
10 TABLET ORAL 2 TIMES DAILY
Qty: 180 TABLET | Refills: 2 | OUTPATIENT
Start: 2023-09-07

## 2023-09-07 NOTE — TELEPHONE ENCOUNTER
Patients daughter calling for some help and direction concerning her mothers neurology medication. Her and her dad have requested refill on namenda to be sent to [email or web address] on numerous occasions and it has been refused by Dr [facility] office. She states at her moms last visit with Dr Vitor Gongora he told them that he would be retiring soon and her PCP could write this medication. The daughter is requesting a referral for a different neurologist if you agree that she still needs to be followed. Also she is looking for some type of  or  that specializes in dementia that can help educate the family on how to continue to care for her mom since she is declining and the dementia is worsening.     Daughter Obi Plascencia [phone]

## 2023-09-08 DIAGNOSIS — G30.0 EARLY ONSET ALZHEIMER'S DEMENTIA WITHOUT BEHAVIORAL DISTURBANCE (HCC): Primary | ICD-10-CM

## 2023-09-08 DIAGNOSIS — F02.80 EARLY ONSET ALZHEIMER'S DEMENTIA WITHOUT BEHAVIORAL DISTURBANCE (HCC): Primary | ICD-10-CM

## 2023-09-08 RX ORDER — MEMANTINE HYDROCHLORIDE 10 MG/1
10 TABLET ORAL 2 TIMES DAILY
Qty: 180 TABLET | Refills: 2 | Status: SHIPPED | OUTPATIENT
Start: 2023-09-08

## 2023-09-18 RX ORDER — MEMANTINE HYDROCHLORIDE 10 MG/1
TABLET ORAL
Qty: 180 TABLET | Refills: 2 | OUTPATIENT
Start: 2023-09-18

## 2023-10-05 ENCOUNTER — OFFICE VISIT (OUTPATIENT)
Dept: RHEUMATOLOGY | Age: 73
End: 2023-10-05
Payer: MEDICARE

## 2023-10-05 VITALS
DIASTOLIC BLOOD PRESSURE: 64 MMHG | HEIGHT: 61 IN | RESPIRATION RATE: 16 BRPM | BODY MASS INDEX: 19.83 KG/M2 | HEART RATE: 76 BPM | WEIGHT: 105 LBS | SYSTOLIC BLOOD PRESSURE: 106 MMHG

## 2023-10-05 DIAGNOSIS — M81.0 POSTMENOPAUSAL OSTEOPOROSIS: ICD-10-CM

## 2023-10-05 DIAGNOSIS — E83.52 SERUM CALCIUM ELEVATED: Primary | ICD-10-CM

## 2023-10-05 DIAGNOSIS — M81.0 POSTMENOPAUSAL OSTEOPOROSIS: Primary | ICD-10-CM

## 2023-10-05 LAB
CALCIUM SERPL-MCNC: 10.8 MG/DL (ref 8.3–10.4)
ERYTHROCYTE [SEDIMENTATION RATE] IN BLOOD: 6 MM/HR (ref 0–30)
PTH-INTACT SERPL-MCNC: 69.8 PG/ML (ref 18.5–88)

## 2023-10-05 PROCEDURE — G8428 CUR MEDS NOT DOCUMENT: HCPCS | Performed by: INTERNAL MEDICINE

## 2023-10-05 PROCEDURE — 1123F ACP DISCUSS/DSCN MKR DOCD: CPT | Performed by: INTERNAL MEDICINE

## 2023-10-05 PROCEDURE — 1090F PRES/ABSN URINE INCON ASSESS: CPT | Performed by: INTERNAL MEDICINE

## 2023-10-05 PROCEDURE — 3017F COLORECTAL CA SCREEN DOC REV: CPT | Performed by: INTERNAL MEDICINE

## 2023-10-05 PROCEDURE — 1036F TOBACCO NON-USER: CPT | Performed by: INTERNAL MEDICINE

## 2023-10-05 PROCEDURE — G8420 CALC BMI NORM PARAMETERS: HCPCS | Performed by: INTERNAL MEDICINE

## 2023-10-05 PROCEDURE — G8399 PT W/DXA RESULTS DOCUMENT: HCPCS | Performed by: INTERNAL MEDICINE

## 2023-10-05 PROCEDURE — 99204 OFFICE O/P NEW MOD 45 MIN: CPT | Performed by: INTERNAL MEDICINE

## 2023-10-05 PROCEDURE — G8484 FLU IMMUNIZE NO ADMIN: HCPCS | Performed by: INTERNAL MEDICINE

## 2023-10-06 LAB
25(OH)D3 SERPL-MCNC: 52.2 NG/ML (ref 30–100)
CRP SERPL-MCNC: 0.5 MG/DL (ref 0–0.9)
MAGNESIUM SERPL-MCNC: 2.7 MG/DL (ref 1.8–2.4)
PHOSPHATE SERPL-MCNC: 3.4 MG/DL (ref 2.3–3.7)

## 2023-10-06 RX ORDER — MELOXICAM 7.5 MG/1
7.5 TABLET ORAL DAILY
Qty: 30 TABLET | Refills: 3 | Status: SHIPPED | OUTPATIENT
Start: 2023-10-06

## 2023-10-06 RX ORDER — MELOXICAM 7.5 MG/1
7.5 TABLET ORAL DAILY
Qty: 30 TABLET | Refills: 3 | OUTPATIENT
Start: 2023-10-06

## 2023-10-09 LAB — PINP SER-MCNC: 109.1 NG/ML (ref 10.4–97.8)

## 2023-10-10 LAB — COLLAGEN CTX SERPL-MCNC: 312 PG/ML

## 2023-10-11 LAB
ALBUMIN SERPL ELPH-MCNC: 3.7 G/DL (ref 2.9–4.4)
ALBUMIN/GLOB SERPL: 1.4 (ref 0.7–1.7)
ALPHA1 GLOB SERPL ELPH-MCNC: 0.2 G/DL (ref 0–0.4)
ALPHA2 GLOB SERPL ELPH-MCNC: 0.7 G/DL (ref 0.4–1)
B-GLOBULIN SERPL ELPH-MCNC: 0.9 G/DL (ref 0.7–1.3)
GAMMA GLOB SERPL ELPH-MCNC: 0.7 G/DL (ref 0.4–1.8)
GLOBULIN SER CALC-MCNC: 2.6 G/DL (ref 2.2–3.9)
M PROTEIN SERPL ELPH-MCNC: NORMAL G/DL
PROT SERPL-MCNC: 6.3 G/DL (ref 6–8.5)

## 2023-10-12 ENCOUNTER — TELEPHONE (OUTPATIENT)
Dept: RHEUMATOLOGY | Age: 73
End: 2023-10-12

## 2023-10-12 NOTE — TELEPHONE ENCOUNTER
Evenity ordered by Dr. Farrah Long. Will be covered 100% with M[employer] and [employer] plan F supplement. Lab Results   Component Value Date    CALCIUM 10.8 (H) 10/05/2023    PHOS 3.4 10/05/2023     Lab Results   Component Value Date    CREATININE 0.60 06/19/2023     Lab Results   Component Value Date/Time    VITD25 52.2 10/05/2023 12:42 PM      Spoke with patient's spouse as she was not available. Scheduled the first Evenity injections on 10/17/23 12:30. Informed him she will receive the injections monthly.

## 2023-10-17 ENCOUNTER — NURSE ONLY (OUTPATIENT)
Dept: RHEUMATOLOGY | Age: 73
End: 2023-10-17
Payer: MEDICARE

## 2023-10-17 VITALS — HEART RATE: 62 BPM | DIASTOLIC BLOOD PRESSURE: 85 MMHG | SYSTOLIC BLOOD PRESSURE: 129 MMHG | TEMPERATURE: 97.2 F

## 2023-10-17 DIAGNOSIS — M81.0 POSTMENOPAUSAL OSTEOPOROSIS: Primary | ICD-10-CM

## 2023-10-17 PROCEDURE — 96372 THER/PROPH/DIAG INJ SC/IM: CPT | Performed by: INTERNAL MEDICINE

## 2023-10-17 NOTE — PROGRESS NOTES
[facility], [address] [place] [address], [address] [place] [address]  Phone: [phone], Fax: [phone]       Patient here for Evenity # 1. Two separate syringes containing 105 mg/1.17 ml were  injected SC today into the patient's left upper arm. Patient tolerated the injections well. Advised patient to continue with calcium and vitamin D post injection. Patient instructed to call with any problems post injection. Medication ordered by Dr. Diana Wild. BP: 129/85    Pre-infusion/injection questionairre for osteoporosis    1. Have you had or are you planning any dental work? NO     2. Are you taking your calcium and vitamin D? YES    3. When was your last osteoporosis treatment? 10/17/23 1st today    4. Have you had any recent fractures?  NO

## 2023-10-25 ENCOUNTER — TELEPHONE (OUTPATIENT)
Dept: RHEUMATOLOGY | Age: 73
End: 2023-10-25

## 2023-10-25 NOTE — TELEPHONE ENCOUNTER
Swp and . Endo referral has lvm for pt to call. Following up to make sure pt was aware that she did need to schedule appt, and why she was going there again.  was not aware, said he will call office and was given number.

## 2023-10-25 NOTE — TELEPHONE ENCOUNTER
----- Message from Adriana Hannah MD sent at 10/19/2023  8:56 AM EDT -----  Her serum calcium is elevated and she has had several elevated serum calciums in the past several CMP's that her primary care had done.   Please schedule a endocrinology consult for hyper calcium  with normal PTH  ----- Message -----  From: Fabian Emmanuel Incoming [facility]  Sent: 10/5/2023   8:50 PM EDT  To: Adriana Hannah MD

## 2023-11-20 ENCOUNTER — OFFICE VISIT (OUTPATIENT)
Dept: ENDOCRINOLOGY | Age: 73
End: 2023-11-20
Payer: MEDICARE

## 2023-11-20 VITALS
OXYGEN SATURATION: 98 % | WEIGHT: 105 LBS | HEART RATE: 60 BPM | DIASTOLIC BLOOD PRESSURE: 68 MMHG | BODY MASS INDEX: 19.84 KG/M2 | SYSTOLIC BLOOD PRESSURE: 128 MMHG

## 2023-11-20 DIAGNOSIS — E55.9 VITAMIN D DEFICIENCY: ICD-10-CM

## 2023-11-20 DIAGNOSIS — E21.0 PRIMARY HYPERPARATHYROIDISM (HCC): Primary | ICD-10-CM

## 2023-11-20 DIAGNOSIS — M81.0 AGE-RELATED OSTEOPOROSIS WITHOUT CURRENT PATHOLOGICAL FRACTURE: ICD-10-CM

## 2023-11-20 PROCEDURE — G8420 CALC BMI NORM PARAMETERS: HCPCS | Performed by: INTERNAL MEDICINE

## 2023-11-20 PROCEDURE — 99204 OFFICE O/P NEW MOD 45 MIN: CPT | Performed by: INTERNAL MEDICINE

## 2023-11-20 PROCEDURE — G8399 PT W/DXA RESULTS DOCUMENT: HCPCS | Performed by: INTERNAL MEDICINE

## 2023-11-20 PROCEDURE — G8484 FLU IMMUNIZE NO ADMIN: HCPCS | Performed by: INTERNAL MEDICINE

## 2023-11-20 PROCEDURE — G8427 DOCREV CUR MEDS BY ELIG CLIN: HCPCS | Performed by: INTERNAL MEDICINE

## 2023-11-20 PROCEDURE — 1036F TOBACCO NON-USER: CPT | Performed by: INTERNAL MEDICINE

## 2023-11-20 PROCEDURE — 3017F COLORECTAL CA SCREEN DOC REV: CPT | Performed by: INTERNAL MEDICINE

## 2023-11-20 PROCEDURE — 1123F ACP DISCUSS/DSCN MKR DOCD: CPT | Performed by: INTERNAL MEDICINE

## 2023-11-20 PROCEDURE — 1090F PRES/ABSN URINE INCON ASSESS: CPT | Performed by: INTERNAL MEDICINE

## 2023-11-20 RX ORDER — DM/PE/ACETAMINOPHEN/CHLORPHENR 10-5-325-2
1 TABLET, SEQUENTIAL ORAL 2 TIMES DAILY
Qty: 60 TABLET | Refills: 5
Start: 2023-11-20

## 2023-11-20 ASSESSMENT — ENCOUNTER SYMPTOMS: SHORTNESS OF BREATH: 0

## 2023-11-20 NOTE — PROGRESS NOTES
Mello Dumont MD, [facility]  [address] [facility], [address] [place] [address], [address]  Phone [phone]          Opal Barajas is a 72 y.o. female seen 11/20/2023 at the request of Dr. Arianna Dhillon for the evaluation of hypercalcemia        ASSESSMENT AND PLAN:    1. Primary hyperparathyroidism ([address])  She has had an elevated serum calcium level since at least 2018 per my review of her labs on the computer. In the setting of a nonsuppressed parathyroid hormone level, she has primary hyperparathyroidism. Based on her history of osteoporosis, parathyroidectomy is indicated. She does have Alzheimer's dementia, but her  and daughter are both in agreement that they would like to proceed with a surgical referral.  I will refer her to Dr. Bates Both today. I will order a neck ultrasound and parathyroid scan to aid in preoperative localization. I will not order a 24-hour urine calcium given the likely difficulty with sample collection at home. 2. Vitamin D deficiency  Vitamin D level normal 10/2023.    - Cholecalciferol (GNP VITAMIN D MAXIMUM STRENGTH) 50 MCG (2000 UT) TABS; Take 1 tablet by mouth 2 times daily  Dispense: 60 tablet; Refill: 5    3. Age-related osteoporosis without current pathological fracture  Followed by Dr. Arianna Dhillon with rheumatology. She is currently receiving Evenity injections. Hopefully parathyroidectomy will help. Follow-up and Dispositions    Return in about 4 months (around 3/20/2024). HISTORY OF PRESENT ILLNESS:    HYPERCALCEMIA    Presentation/Diagnosis: Incidentally noted on routine lab screen,    Risk Factors:  Denies the use of calcium. She takes vitamin D3 2000 units twice a day. Denies thiazides, lithium. No family history of hypercalcemia, hyperparathyroidism, multiple endocrine neoplasia. Symptoms: No history of kidney stones. Denies hematuria, flank pain, polyuria, polydipsia.

## 2023-11-21 ENCOUNTER — NURSE ONLY (OUTPATIENT)
Dept: RHEUMATOLOGY | Age: 73
End: 2023-11-21

## 2023-11-21 VITALS — HEART RATE: 59 BPM | DIASTOLIC BLOOD PRESSURE: 70 MMHG | TEMPERATURE: 97.6 F | SYSTOLIC BLOOD PRESSURE: 132 MMHG

## 2023-11-21 DIAGNOSIS — M81.0 POSTMENOPAUSAL OSTEOPOROSIS: Primary | ICD-10-CM

## 2023-11-21 NOTE — PROGRESS NOTES
[facility], [address] [place] [address], [address] [place] [address]  Phone: [phone], Fax: [phone]       Patient here for Evenity # 2. Two separate syringes containing 105 mg/1.17 ml were  injected SC today into the patient's upper right arm subQ. Patient tolerated the injections well. Advised patient to continue with calcium and vitamin D post injection. Patient instructed to call with any problems post injection. Medication ordered by Dr. Mary Gan. Weight - Scale: 47.6 kg (105 lb), BP: 132/70    Pre-infusion/injection questionairre for osteoporosis    1. Have you had or are you planning any dental work? NO    2. Are you taking your calcium and vitamin D? YES    3. When was your last osteoporosis treatment? 10/17/23    4. Have you had any recent fractures?  NO

## 2023-11-30 ENCOUNTER — HOSPITAL ENCOUNTER (OUTPATIENT)
Dept: ULTRASOUND IMAGING | Age: 73
End: 2023-11-30
Attending: INTERNAL MEDICINE
Payer: MEDICARE

## 2023-11-30 ENCOUNTER — HOSPITAL ENCOUNTER (OUTPATIENT)
Dept: NUCLEAR MEDICINE | Age: 73
End: 2023-11-30
Attending: INTERNAL MEDICINE
Payer: MEDICARE

## 2023-11-30 ENCOUNTER — HOSPITAL ENCOUNTER (OUTPATIENT)
Dept: NUCLEAR MEDICINE | Age: 73
Discharge: HOME | End: 2023-11-30
Attending: INTERNAL MEDICINE
Payer: MEDICARE

## 2023-11-30 DIAGNOSIS — E21.0 PRIMARY HYPERPARATHYROIDISM (HCC): ICD-10-CM

## 2023-11-30 PROCEDURE — 78072 PARATHYRD PLANAR W/SPECT&CT: CPT

## 2023-11-30 PROCEDURE — 3430000000 HC RX DIAGNOSTIC RADIOPHARMACEUTICAL: Performed by: INTERNAL MEDICINE

## 2023-11-30 PROCEDURE — 76536 US EXAM OF HEAD AND NECK: CPT

## 2023-11-30 PROCEDURE — A9500 TC99M SESTAMIBI: HCPCS | Performed by: INTERNAL MEDICINE

## 2023-11-30 RX ORDER — TETRAKIS(2-METHOXYISOBUTYLISOCYANIDE)COPPER(I) TETRAFLUOROBORATE 1 MG/ML
21 INJECTION, POWDER, LYOPHILIZED, FOR SOLUTION INTRAVENOUS
Status: COMPLETED | OUTPATIENT
Start: 2023-11-30 | End: 2023-11-30

## 2023-11-30 RX ADMIN — KIT FOR THE PREPARATION OF TECHNETIUM TC99M SESTAMIBI 21 MILLICURIE: 1 INJECTION, POWDER, LYOPHILIZED, FOR SOLUTION PARENTERAL at 08:00

## 2023-12-05 RX ORDER — DULOXETIN HYDROCHLORIDE 30 MG/1
30 CAPSULE, DELAYED RELEASE ORAL DAILY
Qty: 30 CAPSULE | Refills: 5 | Status: SHIPPED | OUTPATIENT
Start: 2023-12-05

## 2023-12-05 RX ORDER — DULOXETIN HYDROCHLORIDE 30 MG/1
30 CAPSULE, DELAYED RELEASE ORAL DAILY
Qty: 30 CAPSULE | Refills: 5 | OUTPATIENT
Start: 2023-12-05

## 2023-12-05 RX ORDER — MELOXICAM 7.5 MG/1
7.5 TABLET ORAL DAILY
Qty: 30 TABLET | Refills: 3 | Status: SHIPPED | OUTPATIENT
Start: 2023-12-05

## 2023-12-12 ENCOUNTER — PATIENT MESSAGE (OUTPATIENT)
Dept: ENT CLINIC | Age: 73
End: 2023-12-12

## 2023-12-12 ENCOUNTER — PREP FOR PROCEDURE (OUTPATIENT)
Dept: ENT CLINIC | Age: 73
End: 2023-12-12

## 2023-12-12 ENCOUNTER — OFFICE VISIT (OUTPATIENT)
Dept: ENT CLINIC | Age: 73
End: 2023-12-12
Payer: MEDICARE

## 2023-12-12 VITALS — BODY MASS INDEX: 20.2 KG/M2 | WEIGHT: 107 LBS | HEIGHT: 61 IN | RESPIRATION RATE: 17 BRPM

## 2023-12-12 DIAGNOSIS — E21.0 PRIMARY HYPERPARATHYROIDISM (HCC): Primary | ICD-10-CM

## 2023-12-12 PROCEDURE — 1123F ACP DISCUSS/DSCN MKR DOCD: CPT | Performed by: OTOLARYNGOLOGY

## 2023-12-12 PROCEDURE — 3017F COLORECTAL CA SCREEN DOC REV: CPT | Performed by: OTOLARYNGOLOGY

## 2023-12-12 PROCEDURE — G8428 CUR MEDS NOT DOCUMENT: HCPCS | Performed by: OTOLARYNGOLOGY

## 2023-12-12 PROCEDURE — G8484 FLU IMMUNIZE NO ADMIN: HCPCS | Performed by: OTOLARYNGOLOGY

## 2023-12-12 PROCEDURE — G8420 CALC BMI NORM PARAMETERS: HCPCS | Performed by: OTOLARYNGOLOGY

## 2023-12-12 PROCEDURE — 99204 OFFICE O/P NEW MOD 45 MIN: CPT | Performed by: OTOLARYNGOLOGY

## 2023-12-12 PROCEDURE — 1036F TOBACCO NON-USER: CPT | Performed by: OTOLARYNGOLOGY

## 2023-12-12 PROCEDURE — 1090F PRES/ABSN URINE INCON ASSESS: CPT | Performed by: OTOLARYNGOLOGY

## 2023-12-12 PROCEDURE — G8399 PT W/DXA RESULTS DOCUMENT: HCPCS | Performed by: OTOLARYNGOLOGY

## 2023-12-12 ASSESSMENT — ENCOUNTER SYMPTOMS
ABDOMINAL PAIN: 0
SORE THROAT: 0
SHORTNESS OF BREATH: 0

## 2023-12-12 NOTE — PROGRESS NOTES
a  thyroid adenoma or an intrathyroidal parathyroid adenoma. ASSESSMENT and PLAN      ICD-10-CM    1. Primary hyperparathyroidism ([address])  E21.0         She has signs and symptoms of primary hyperparathyroidism, and her recent imaging studies suggest a likely right inferior parathyroid adenoma. She meets surgical criteria based on her worsening osteoporosis. At this time, I recommend proceeding w/ parathyroidectomy w/ NIMS and intra-operative PTH monitoring. I discussed all the risks of parathyroid surgery including bleeding/hematoma, infection, inability to find the adenoma or hyperplastic glands with continued hypercalcemia, post-op hypocalcemia, and hoarseness, and they would like to proceed.      Shilpi Worrell MD  12/12/2023    Electronically signed by Shilpi Worrell MD on 12/12/2023 at 10:48 AM

## 2024-01-04 DIAGNOSIS — G89.18 POST-OP PAIN: Primary | ICD-10-CM

## 2024-01-04 RX ORDER — HYDROCODONE BITARTRATE AND ACETAMINOPHEN 5; 325 MG/1; MG/1
1 TABLET ORAL EVERY 4 HOURS PRN
Qty: 30 TABLET | Refills: 0 | Status: SHIPPED | OUTPATIENT
Start: 2024-01-04 | End: 2024-01-05

## 2024-01-04 RX ORDER — ONDANSETRON 4 MG/1
4 TABLET, ORALLY DISINTEGRATING ORAL 3 TIMES DAILY PRN
Qty: 10 TABLET | Refills: 0 | Status: SHIPPED | OUTPATIENT
Start: 2024-01-04 | End: 2024-01-05

## 2024-01-04 RX ORDER — CEPHALEXIN 500 MG/1
500 CAPSULE ORAL 4 TIMES DAILY
Qty: 28 CAPSULE | Refills: 0 | Status: SHIPPED | OUTPATIENT
Start: 2024-01-04 | End: 2024-01-05

## 2024-01-05 ENCOUNTER — HOSPITAL ENCOUNTER (OUTPATIENT)
Dept: SURGERY | Age: 74
End: 2024-01-05
Payer: MEDICARE

## 2024-01-05 VITALS
HEART RATE: 74 BPM | SYSTOLIC BLOOD PRESSURE: 122 MMHG | HEIGHT: 61 IN | WEIGHT: 106.4 LBS | DIASTOLIC BLOOD PRESSURE: 55 MMHG | TEMPERATURE: 97.5 F | BODY MASS INDEX: 20.09 KG/M2 | OXYGEN SATURATION: 98 % | RESPIRATION RATE: 16 BRPM

## 2024-01-05 LAB
ALBUMIN SERPL-MCNC: 3.7 G/DL (ref 3.2–4.6)
ALBUMIN/GLOB SERPL: 1.1 (ref 0.4–1.6)
ALP SERPL-CCNC: 152 U/L (ref 50–136)
ALT SERPL-CCNC: 21 U/L (ref 12–65)
ANION GAP SERPL CALC-SCNC: 2 MMOL/L (ref 2–11)
AST SERPL-CCNC: 20 U/L (ref 15–37)
BILIRUB SERPL-MCNC: 0.4 MG/DL (ref 0.2–1.1)
BUN SERPL-MCNC: 20 MG/DL (ref 8–23)
CALCIUM SERPL-MCNC: 10.2 MG/DL (ref 8.3–10.4)
CHLORIDE SERPL-SCNC: 111 MMOL/L (ref 103–113)
CO2 SERPL-SCNC: 30 MMOL/L (ref 21–32)
CREAT SERPL-MCNC: 0.59 MG/DL (ref 0.6–1)
GLOBULIN SER CALC-MCNC: 3.3 G/DL (ref 2.8–4.5)
GLUCOSE SERPL-MCNC: 111 MG/DL (ref 65–100)
POTASSIUM SERPL-SCNC: 3.8 MMOL/L (ref 3.5–5.1)
PROT SERPL-MCNC: 7 G/DL (ref 6.3–8.2)
SODIUM SERPL-SCNC: 143 MMOL/L (ref 136–146)

## 2024-01-05 PROCEDURE — 80053 COMPREHEN METABOLIC PANEL: CPT

## 2024-01-05 NOTE — PERIOP NOTE
Patient verified name and     Order for consent  found in EHR and matches case posting; patient verified.     Type 1B surgery, Walk in assessment complete.    Labs per surgeon: none;   Labs per anesthesia protocol: CMP; results pending  EKG: not needed per anesthesia protocol     Hospital approved surgical skin cleanser and instructions given per hospital policy.    Patient provided with and instructed on educational handouts including Guide to Surgery, Pain Management, Hand Hygiene, Blood Transfusion Education, and [facility] Brochure.    Patient answered medical/surgical history questions at their best of ability. All prior to admission medications documented in [facility]. Original medication prescription bottle not visualized during patient appointment.     Patient instructed to hold all vitamins 7 days prior to surgery and NSAIDS 5 days prior to surgery, patient verbalized understanding.     Patient teach back successful and patient demonstrates knowledge of instructions.    PLEASE CONTINUE TAKING ALL PRESCRIPTION MEDICATIONS UP TO THE DAY OF SURGERY UNLESS OTHERWISE DIRECTED BELOW. You may take Tylenol, allergy,  and/or indigestion medications.     TAKE ONLY THESE MEDICATIONS ON THE DAY OF SURGERY   Meloxicam (Mobic)     memantine (Namenda)    Duloxetine (Cymbalta)        DISCONTINUE all vitamins and supplements 7 days prior to surgery. DISCONTINUE Non-Steroidal Anti-Inflammatory (NSAIDS) such as Advil and Aleve 5 days prior to surgery.     Home Medications to Hold- please continue all other medications except these.    donepezil (Aricept) Hold from today until after surgery.           Comments   Dynahex shower the night before surgery and the morning of surgery.             Please do not bring home medications with you on the day of surgery unless otherwise directed by your nurse.  If you are instructed to bring home medications, please give them to your nurse as they will be administered by  the nursing staff.    If you have any questions, please call [facility] [phone].    A copy of this note was provided to the patient for reference.

## 2024-01-05 NOTE — PERIOP NOTE
Labs within anesthesia guidelines, no follow-up required. Labs automatically routed to ordering provider via Epic documentation.

## 2024-01-07 DIAGNOSIS — R41.3 MEMORY LOSS OR IMPAIRMENT: ICD-10-CM

## 2024-01-08 RX ORDER — DONEPEZIL HYDROCHLORIDE 10 MG/1
10 TABLET, FILM COATED ORAL NIGHTLY
Qty: 90 TABLET | Refills: 2 | OUTPATIENT
Start: 2024-01-08

## 2024-01-10 ENCOUNTER — ANESTHESIA EVENT (OUTPATIENT)
Dept: SURGERY | Age: 74
End: 2024-01-10
Payer: MEDICARE

## 2024-01-11 ENCOUNTER — HOSPITAL ENCOUNTER (OUTPATIENT)
Age: 74
Setting detail: OUTPATIENT SURGERY
Discharge: HOME | End: 2024-01-11
Attending: OTOLARYNGOLOGY | Admitting: OTOLARYNGOLOGY
Payer: MEDICARE

## 2024-01-11 ENCOUNTER — ANESTHESIA (OUTPATIENT)
Dept: SURGERY | Age: 74
End: 2024-01-11
Payer: MEDICARE

## 2024-01-11 VITALS
BODY MASS INDEX: 19.32 KG/M2 | OXYGEN SATURATION: 100 % | SYSTOLIC BLOOD PRESSURE: 134 MMHG | HEART RATE: 92 BPM | DIASTOLIC BLOOD PRESSURE: 63 MMHG | HEIGHT: 62 IN | RESPIRATION RATE: 15 BRPM | WEIGHT: 105 LBS | TEMPERATURE: 98.2 F

## 2024-01-11 DIAGNOSIS — E21.0 PRIMARY HYPERPARATHYROIDISM (HCC): ICD-10-CM

## 2024-01-11 LAB
IO-PTH BASELINE: 184.2 PG/ML (ref 18.5–88)
IO-PTH ZERO TIME: 43.2 PG/ML (ref 18.5–88)
PTH-INTACT P EXCISION SERPL-MCNC: 28.9 PG/ML
SPECIMEN DRAWN SERPL: 843
SPECIMEN DRAWN SERPL: 904
SPECIMEN DRAWN SERPL: 925

## 2024-01-11 PROCEDURE — 7100000001 HC PACU RECOVERY - ADDTL 15 MIN: Performed by: OTOLARYNGOLOGY

## 2024-01-11 PROCEDURE — 2709999900 HC NON-CHARGEABLE SUPPLY: Performed by: OTOLARYNGOLOGY

## 2024-01-11 PROCEDURE — 6360000002 HC RX W HCPCS: Performed by: NURSE ANESTHETIST, CERTIFIED REGISTERED

## 2024-01-11 PROCEDURE — 88305 TISSUE EXAM BY PATHOLOGIST: CPT

## 2024-01-11 PROCEDURE — 2580000003 HC RX 258: Performed by: NURSE ANESTHETIST, CERTIFIED REGISTERED

## 2024-01-11 PROCEDURE — 3600000014 HC SURGERY LEVEL 4 ADDTL 15MIN: Performed by: OTOLARYNGOLOGY

## 2024-01-11 PROCEDURE — 2500000003 HC RX 250 WO HCPCS: Performed by: OTOLARYNGOLOGY

## 2024-01-11 PROCEDURE — 7100000011 HC PHASE II RECOVERY - ADDTL 15 MIN: Performed by: OTOLARYNGOLOGY

## 2024-01-11 PROCEDURE — 6370000000 HC RX 637 (ALT 250 FOR IP): Performed by: ANESTHESIOLOGY

## 2024-01-11 PROCEDURE — 2720000010 HC SURG SUPPLY STERILE: Performed by: OTOLARYNGOLOGY

## 2024-01-11 PROCEDURE — 7100000010 HC PHASE II RECOVERY - FIRST 15 MIN: Performed by: OTOLARYNGOLOGY

## 2024-01-11 PROCEDURE — 7100000000 HC PACU RECOVERY - FIRST 15 MIN: Performed by: OTOLARYNGOLOGY

## 2024-01-11 PROCEDURE — 2580000003 HC RX 258: Performed by: ANESTHESIOLOGY

## 2024-01-11 PROCEDURE — 3600000004 HC SURGERY LEVEL 4 BASE: Performed by: OTOLARYNGOLOGY

## 2024-01-11 PROCEDURE — 60500 EXPLORE PARATHYROID GLANDS: CPT | Performed by: OTOLARYNGOLOGY

## 2024-01-11 PROCEDURE — 88331 PATH CONSLTJ SURG 1 BLK 1SPC: CPT

## 2024-01-11 PROCEDURE — 6360000002 HC RX W HCPCS: Performed by: ANESTHESIOLOGY

## 2024-01-11 PROCEDURE — 83970 ASSAY OF PARATHORMONE: CPT

## 2024-01-11 PROCEDURE — 2500000003 HC RX 250 WO HCPCS: Performed by: NURSE ANESTHETIST, CERTIFIED REGISTERED

## 2024-01-11 PROCEDURE — 3700000001 HC ADD 15 MINUTES (ANESTHESIA): Performed by: OTOLARYNGOLOGY

## 2024-01-11 PROCEDURE — 3700000000 HC ANESTHESIA ATTENDED CARE: Performed by: OTOLARYNGOLOGY

## 2024-01-11 RX ORDER — DEXTROSE MONOHYDRATE 100 MG/ML
INJECTION, SOLUTION INTRAVENOUS CONTINUOUS PRN
Status: DISCONTINUED | OUTPATIENT
Start: 2024-01-11 | End: 2024-01-11 | Stop reason: HOSPADM

## 2024-01-11 RX ORDER — SODIUM CHLORIDE, SODIUM LACTATE, POTASSIUM CHLORIDE, CALCIUM CHLORIDE 600; 310; 30; 20 MG/100ML; MG/100ML; MG/100ML; MG/100ML
INJECTION, SOLUTION INTRAVENOUS CONTINUOUS
Status: DISCONTINUED | OUTPATIENT
Start: 2024-01-11 | End: 2024-01-11 | Stop reason: HOSPADM

## 2024-01-11 RX ORDER — SODIUM CHLORIDE 9 MG/ML
INJECTION, SOLUTION INTRAVENOUS PRN
Status: DISCONTINUED | OUTPATIENT
Start: 2024-01-11 | End: 2024-01-11 | Stop reason: HOSPADM

## 2024-01-11 RX ORDER — PROPOFOL 10 MG/ML
INJECTION, EMULSION INTRAVENOUS PRN
Status: DISCONTINUED | OUTPATIENT
Start: 2024-01-11 | End: 2024-01-11 | Stop reason: SDUPTHER

## 2024-01-11 RX ORDER — PROCHLORPERAZINE EDISYLATE 5 MG/ML
5 INJECTION INTRAMUSCULAR; INTRAVENOUS
Status: DISCONTINUED | OUTPATIENT
Start: 2024-01-11 | End: 2024-01-11 | Stop reason: HOSPADM

## 2024-01-11 RX ORDER — HYDROMORPHONE HYDROCHLORIDE 2 MG/ML
0.5 INJECTION, SOLUTION INTRAMUSCULAR; INTRAVENOUS; SUBCUTANEOUS EVERY 10 MIN PRN
Status: DISCONTINUED | OUTPATIENT
Start: 2024-01-11 | End: 2024-01-11 | Stop reason: HOSPADM

## 2024-01-11 RX ORDER — DIPHENHYDRAMINE HYDROCHLORIDE 50 MG/ML
12.5 INJECTION INTRAMUSCULAR; INTRAVENOUS
Status: DISCONTINUED | OUTPATIENT
Start: 2024-01-11 | End: 2024-01-11 | Stop reason: HOSPADM

## 2024-01-11 RX ORDER — FENTANYL CITRATE 50 UG/ML
100 INJECTION, SOLUTION INTRAMUSCULAR; INTRAVENOUS
Status: COMPLETED | OUTPATIENT
Start: 2024-01-11 | End: 2024-01-11

## 2024-01-11 RX ORDER — LIDOCAINE HYDROCHLORIDE 20 MG/ML
INJECTION, SOLUTION EPIDURAL; INFILTRATION; INTRACAUDAL; PERINEURAL PRN
Status: DISCONTINUED | OUTPATIENT
Start: 2024-01-11 | End: 2024-01-11 | Stop reason: SDUPTHER

## 2024-01-11 RX ORDER — SODIUM CHLORIDE 0.9 % (FLUSH) 0.9 %
5-40 SYRINGE (ML) INJECTION EVERY 12 HOURS SCHEDULED
Status: DISCONTINUED | OUTPATIENT
Start: 2024-01-11 | End: 2024-01-11 | Stop reason: HOSPADM

## 2024-01-11 RX ORDER — MIDAZOLAM HYDROCHLORIDE 2 MG/2ML
2 INJECTION, SOLUTION INTRAMUSCULAR; INTRAVENOUS
Status: DISCONTINUED | OUTPATIENT
Start: 2024-01-11 | End: 2024-01-11 | Stop reason: HOSPADM

## 2024-01-11 RX ORDER — EPHEDRINE SULFATE/0.9% NACL/PF 50 MG/5 ML
SYRINGE (ML) INTRAVENOUS PRN
Status: DISCONTINUED | OUTPATIENT
Start: 2024-01-11 | End: 2024-01-11 | Stop reason: SDUPTHER

## 2024-01-11 RX ORDER — OXYCODONE HYDROCHLORIDE 5 MG/1
5 TABLET ORAL
Status: DISCONTINUED | OUTPATIENT
Start: 2024-01-11 | End: 2024-01-11 | Stop reason: HOSPADM

## 2024-01-11 RX ORDER — LIDOCAINE HYDROCHLORIDE 10 MG/ML
1 INJECTION, SOLUTION INFILTRATION; PERINEURAL
Status: DISCONTINUED | OUTPATIENT
Start: 2024-01-11 | End: 2024-01-11 | Stop reason: HOSPADM

## 2024-01-11 RX ORDER — ACETAMINOPHEN 500 MG
1000 TABLET ORAL ONCE
Status: COMPLETED | OUTPATIENT
Start: 2024-01-11 | End: 2024-01-11

## 2024-01-11 RX ORDER — SUCCINYLCHOLINE/SOD CL,ISO/PF 200MG/10ML
SYRINGE (ML) INTRAVENOUS PRN
Status: DISCONTINUED | OUTPATIENT
Start: 2024-01-11 | End: 2024-01-11 | Stop reason: SDUPTHER

## 2024-01-11 RX ORDER — DEXAMETHASONE SODIUM PHOSPHATE 4 MG/ML
INJECTION, SOLUTION INTRA-ARTICULAR; INTRALESIONAL; INTRAMUSCULAR; INTRAVENOUS; SOFT TISSUE PRN
Status: DISCONTINUED | OUTPATIENT
Start: 2024-01-11 | End: 2024-01-11 | Stop reason: SDUPTHER

## 2024-01-11 RX ORDER — ONDANSETRON 2 MG/ML
INJECTION INTRAMUSCULAR; INTRAVENOUS PRN
Status: DISCONTINUED | OUTPATIENT
Start: 2024-01-11 | End: 2024-01-11 | Stop reason: SDUPTHER

## 2024-01-11 RX ORDER — SODIUM CHLORIDE 0.9 % (FLUSH) 0.9 %
5-40 SYRINGE (ML) INJECTION PRN
Status: DISCONTINUED | OUTPATIENT
Start: 2024-01-11 | End: 2024-01-11 | Stop reason: HOSPADM

## 2024-01-11 RX ADMIN — Medication 20 MG: at 08:25

## 2024-01-11 RX ADMIN — Medication 25 MG: at 08:49

## 2024-01-11 RX ADMIN — PHENYLEPHRINE HYDROCHLORIDE 100 MCG: 10 INJECTION INTRAVENOUS at 08:31

## 2024-01-11 RX ADMIN — FENTANYL CITRATE 50 MCG: 50 INJECTION, SOLUTION INTRAMUSCULAR; INTRAVENOUS at 08:44

## 2024-01-11 RX ADMIN — Medication 10 MG: at 09:07

## 2024-01-11 RX ADMIN — FENTANYL CITRATE 50 MCG: 50 INJECTION, SOLUTION INTRAMUSCULAR; INTRAVENOUS at 08:16

## 2024-01-11 RX ADMIN — SODIUM CHLORIDE, SODIUM LACTATE, POTASSIUM CHLORIDE, AND CALCIUM CHLORIDE: 600; 310; 30; 20 INJECTION, SOLUTION INTRAVENOUS at 07:38

## 2024-01-11 RX ADMIN — SODIUM CHLORIDE, SODIUM LACTATE, POTASSIUM CHLORIDE, AND CALCIUM CHLORIDE: 600; 310; 30; 20 INJECTION, SOLUTION INTRAVENOUS at 09:36

## 2024-01-11 RX ADMIN — PHENYLEPHRINE HYDROCHLORIDE 50 MCG: 10 INJECTION INTRAVENOUS at 09:14

## 2024-01-11 RX ADMIN — LIDOCAINE HYDROCHLORIDE 60 MG: 20 INJECTION, SOLUTION EPIDURAL; INFILTRATION; INTRACAUDAL; PERINEURAL at 08:16

## 2024-01-11 RX ADMIN — PROPOFOL 20 MG: 10 INJECTION, EMULSION INTRAVENOUS at 08:37

## 2024-01-11 RX ADMIN — ONDANSETRON 4 MG: 2 INJECTION INTRAMUSCULAR; INTRAVENOUS at 09:33

## 2024-01-11 RX ADMIN — Medication 120 MG: at 08:16

## 2024-01-11 RX ADMIN — PHENYLEPHRINE HYDROCHLORIDE 100 MCG: 10 INJECTION INTRAVENOUS at 08:42

## 2024-01-11 RX ADMIN — FENTANYL CITRATE 50 MCG: 50 INJECTION, SOLUTION INTRAMUSCULAR; INTRAVENOUS at 09:00

## 2024-01-11 RX ADMIN — DEXAMETHASONE SODIUM PHOSPHATE 4 MG: 4 INJECTION, SOLUTION INTRAMUSCULAR; INTRAVENOUS at 09:33

## 2024-01-11 RX ADMIN — PROPOFOL 150 MG: 10 INJECTION, EMULSION INTRAVENOUS at 08:16

## 2024-01-11 RX ADMIN — PHENYLEPHRINE HYDROCHLORIDE 100 MCG: 10 INJECTION INTRAVENOUS at 08:49

## 2024-01-11 RX ADMIN — Medication 10 MG: at 09:10

## 2024-01-11 RX ADMIN — ACETAMINOPHEN 1000 MG: 500 TABLET ORAL at 07:38

## 2024-01-11 RX ADMIN — Medication 10 MG: at 08:26

## 2024-01-11 RX ADMIN — Medication 20 MG: at 08:31

## 2024-01-11 ASSESSMENT — PAIN - FUNCTIONAL ASSESSMENT: PAIN_FUNCTIONAL_ASSESSMENT: 0-10

## 2024-01-11 NOTE — DISCHARGE INSTRUCTIONS
-There are steri-strips in place over your neck incision. All of the sutures are deep to these steri strips. You may shower and bathe as long as these steri strips remain clean and dry  -No strenuous activity or heavy lifting for 1 week  -Please start with soft foods and advance diet  -You may experience some numbness/tingling of the extremities or perioral region as your calcium drops to a normal level over the next week. You may take Tums as needed to help ease these symptoms.    Your Recovery  Parathyroidectomy is the removal of one or more of the four parathyroid glands in the neck. These small glands, located on the thyroid gland, help control the amount of calcium in the body. When they are too active, these glands cause high levels of calcium. This is called hyperparathyroidism (say \"hy-per-pair-mz-KMM-fljn-iz-um\").  You may leave the hospital with stitches in the cut the doctor made (incision). Your doctor will tell you if you need to come back to have these removed. You may still have a tube called a drain in your neck. Your doctor will take this out a few days after your surgery.  You may have some trouble chewing and swallowing after you go home. Your voice probably will be hoarse, and you may have trouble talking. For most people, these problems get better within a few weeks, but it can take longer. In some cases, this surgery causes permanent problems with chewing, speaking, or swallowing.  This care sheet gives you a general idea about how long it will take for you to recover. But each person recovers at a different pace. Follow the steps below to get better as quickly as possible.  How can you care for yourself at home?  Activity    Rest when you feel tired. Getting enough sleep will help you recover. When you lie down, put two or three pillows under your head to keep it raised.     Try to walk each day. Start by walking a little more than you did the day before. Bit by bit, increase the amount you

## 2024-01-11 NOTE — ANESTHESIA POSTPROCEDURE EVALUATION
Department of Anesthesiology  Postprocedure Note    Patient: Jae Lopez  MRN: [id number]  YOB: 1950  Date of evaluation: 1/11/2024    Procedure Summary     Date: 01/11/24 Room / Location: [facility] MAIN OR [address] [place] [address] MAIN OR    Anesthesia Start: 0805 Anesthesia Stop: 1011    Procedure: PARATHYROIDECTOMY (Neck) Diagnosis:       Primary hyperparathyroidism (HCC)      (Primary hyperparathyroidism (HCC) [E21.0])    Providers: Richie Luis MD Responsible Provider: Francisco Diamond MD    Anesthesia Type: general ASA Status: 2          Anesthesia Type: No value filed.    Baron Phase I: Baron Score: 7    Baron Phase II: Baron Score: 10    Anesthesia Post Evaluation    Patient location during evaluation: PACU  Patient participation: complete - patient participated  Level of consciousness: awake and alert  Airway patency: patent  Nausea: well controlled.  Cardiovascular status: acceptable.  Respiratory status: acceptable  Hydration status: stable  Pain management: adequate        No notable events documented.

## 2024-01-11 NOTE — BRIEF OP NOTE
Brief Postoperative Note      Patient: Jae Lopez  YOB: 1950  MRN: [id number]    Date of Procedure: 1/11/2024    Pre-Op Diagnosis Codes:     * Primary hyperparathyroidism (HCC) [E21.0]    Post-Op Diagnosis:   Primary HPTH  L inferior adenoma       Procedure(s):  PARATHYROIDECTOMY    Surgeon(s):  Richei Luis MD    Assistant:  * No surgical staff found *    Anesthesia: General    Estimated Blood Loss (mL): Minimal    IVF: 1100 cc    Complications: None    Specimens:   ID Type Source Tests Collected by Time Destination   A : question left inferior parathyroid Tissue Neck SURGICAL PATHOLOGY Richie Luis MD 1/11/2024 0854        Implants:  * No implants in log *      Drains: * No LDAs found *    Findings: L inferior adenoma      Electronically signed by Richie Luis MD on 1/11/2024 at 9:51 AM

## 2024-01-11 NOTE — OP NOTE
[facility]  OPERATIVE REPORT    Name:  ARON SOLOMON  MR#:  [id number]  :  1950  ACCOUNT #:  [id number]  DATE OF SERVICE:  2024    PREOPERATIVE DIAGNOSIS:  Primary hyperparathyroidism.    POSTOPERATIVE DIAGNOSES:  1.  Primary hyperparathyroidism.  2.  Left inferior parathyroid adenoma.    PROCEDURE PERFORMED:  Parathyroidectomy.    SURGEON:  Richie Luis MD    ASSISTANT:  none    ANESTHESIA:  General endotracheal.    ANESTHESIOLOGIST:  Rupesh Diamond MD    COMPLICATIONS:  None.    SPECIMENS REMOVED:  ? Left inferior parathyroid - frozen.    IMPLANTS:  none.    ESTIMATED BLOOD LOSS:  5 mL.    OPERATIVE FINDINGS:  1.  There was enlarged left inferior parathyroid gland, consistent with hypercellular parathyroid tissue on frozen section analysis.  2.  The intraoperative PTH level decreased from 184.2 down to 43.2, ten minutes after excision of this likely adenoma.  3.  The left recurrent laryngeal nerve was identified just deep to this adenoma and stimulated strongly throughout the case.  4.  There were no concerning parathyroid glands on the right side.    IV FLUIDS:  1100 mL crystalloid.    DRAINS:  None.    DISPOSITION:  PACU, then home.    CONDITION:  Stable.    BRIEF HISTORY:  The patient is a 73-year-old female who was referred to my office for primary hyperparathyroidism.  She has had worsening bone density including osteoporosis on a DEXA scan.  Her imaging studies suggested a likely right inferior parathyroid adenoma.  The decision was made to take her to the operating room for a parathyroidectomy.    DESCRIPTION OF PROCEDURE:  The patient was brought back to the operating room, placed on the table in supine position.  General endotracheal anesthesia was inducted out any complications.  A Ruzukutronic recurrent laryngeal nerve monitoring tube was placed.  Its position was confirmed within the glottis.  Once the patient was adequately sedated, a total of 8 mL of 1% lidocaine with

## 2024-01-11 NOTE — H&P
72 yo female seen recently for HPTH.  She has had an elevated calcium level for the past several years, most recently 10.8- it had been as high as 11.8 earlier this summer.  She has known osteoporosis based on a previous DEXA scan, but denies any recent bony fractures or long bone pain.  There is no history of nephrolithiasis, chronic kidney disease, or abdominal complaints.  She does have underlying Alzheimer's dementia and takes Namenda and Aricept daily.  No previous neck surgery or trauma.  She recently underwent neck ultrasound and sestamibi scan for localization.    Past Medical History:   Diagnosis Date    Alzheimer's dementia (HCC)     H/O seasonal allergies     Hypercalcemia     Osteoporosis     Vitamin D deficiency      Past Surgical History:   Procedure Laterality Date    BREAST SURGERY      benign breast lump    HYSTERECTOMY (CERVIX STATUS UNKNOWN)  2006    LSH/BSO    GENIA BIOPSY BREAST STEREOTACTIC Right 2004     Social History     Socioeconomic History    Marital status:      Spouse name: Not on file    Number of children: Not on file    Years of education: Not on file    Highest education level: Not on file   Occupational History    Not on file   Tobacco Use    Smoking status: Never    Smokeless tobacco: Never   Vaping Use    Vaping Use: Never used   Substance and Sexual Activity    Alcohol use: Not Currently     Alcohol/week: 7.0 standard drinks of alcohol    Drug use: Never    Sexual activity: Not on file   Other Topics Concern    Not on file   Social History Narrative    Not on file     Social Determinants of Health     Financial Resource Strain: Low Risk  (8/7/2023)    Overall Financial Resource Strain (CARDIA)     Difficulty of Paying Living Expenses: Not hard at all   Food Insecurity: Not on file (8/7/2023)   Transportation Needs: Unknown (8/7/2023)    PRAPARE - Transportation     Lack of Transportation (Medical): Not on file     Lack of Transportation (Non-Medical): No   Physical

## 2024-01-11 NOTE — ANESTHESIA PROCEDURE NOTES
Arterial Line:    An arterial line was placed using surface landmarks, in the OR for the following indication(s): continuous blood pressure monitoring and blood sampling needed.    A 20 gauge (size) (length), Arrow (type) catheter was placed, Seldinger technique used, into the right (1st attempts on L radial per SRNA) radial artery, secured by tape and Tegaderm.  Anesthesia type: General    Events:  patient tolerated procedure well with no complications and EBL < 5mL.1/11/2024 8:30 AM1/11/2024 8:35 AM  Anesthesiologist: Francisco Diamond MD  Performed: Anesthesiologist   Preanesthetic Checklist  Completed: patient identified, IV checked, site marked, risks and benefits discussed, surgical/procedural consents, equipment checked, pre-op evaluation, timeout performed, anesthesia consent given, oxygen available and monitors applied/VS acknowledged

## 2024-01-11 NOTE — ANESTHESIA PRE PROCEDURE
Problem List   Diagnosis Code   • Early onset Alzheimer's dementia without behavioral disturbance (HCC) G30.0, F02.80   • Encounter for medication management Z79.899   • H/O seasonal allergies Z88.9   • Rash and nonspecific skin eruption R21   • Vitamin D deficiency E55.9   • Major depressive disorder, recurrent, mild F33.0   • Primary hyperparathyroidism (HCC) E21.0   • Age-related osteoporosis without current pathological fracture M81.0       Past Medical History:        Diagnosis Date   • Alzheimer's dementia (HCC)    • H/O seasonal allergies    • Hypercalcemia    • Osteoporosis    • Vitamin D deficiency        Past Surgical History:        Procedure Laterality Date   • BREAST SURGERY      benign breast lump   • HYSTERECTOMY (CERVIX STATUS UNKNOWN)  2006    LSH/BSO   • GENIA BIOPSY BREAST STEREOTACTIC Right 2004       Social History:    Social History     Tobacco Use   • Smoking status: Never   • Smokeless tobacco: Never   Substance Use Topics   • Alcohol use: Not Currently     Alcohol/week: 7.0 standard drinks of alcohol                                Counseling given: Not Answered      Vital Signs (Current):   Vitals:    01/11/24 0720   BP: (!) 154/65   Pulse: 65   Resp: 12   Temp: 97.7 °F (36.5 °C)   TempSrc: Oral   SpO2: 100%   Weight: 47.6 kg (105 lb)   Height: 1.575 m (5' 2\")                                              BP Readings from Last 3 Encounters:   01/11/24 (!) 154/65   01/05/24 (!) 122/55   12/21/23 (!) 140/79       NPO Status: Time of last liquid consumption: 0000                        Time of last solid consumption: 0000                        Date of last liquid consumption: 01/10/24                        Date of last solid food consumption: 01/10/24    BMI:   Wt Readings from Last 3 Encounters:   01/11/24 47.6 kg (105 lb)   01/05/24 48.3 kg (106 lb 6.4 oz)   12/12/23 48.5 kg (107 lb)     Body mass index is 19.2 kg/m².    CBC:   Lab Results   Component Value Date/Time    WBC 9.7 06/19/2023

## 2024-01-19 ENCOUNTER — OFFICE VISIT (OUTPATIENT)
Dept: ENT CLINIC | Age: 74
End: 2024-01-19

## 2024-01-19 DIAGNOSIS — E89.2 S/P PARATHYROIDECTOMY (HCC): Primary | ICD-10-CM

## 2024-01-19 DIAGNOSIS — E89.2 S/P PARATHYROIDECTOMY (HCC): ICD-10-CM

## 2024-01-19 LAB — CALCIUM SERPL-MCNC: 9.5 MG/DL (ref 8.3–10.4)

## 2024-01-19 PROCEDURE — 99024 POSTOP FOLLOW-UP VISIT: CPT | Performed by: OTOLARYNGOLOGY

## 2024-01-19 NOTE — PROGRESS NOTES
Jae Lopez is a 73 y.o. female seen today now 1 week post-op after undergoing parathyroidectomy back on 1/11/2024.  She had an enlarged left inferior parathyroid gland, consistent with hypercellular parathyroid tissue on frozen section analysis, and her intraoperative PTH level decreased from 184.2 down to 43.2, ten minutes after excision of this likely adenoma.  Doing great since surgery, but she did remove the Steri-Strips accidentally the following day.  There had been some mild bleeding from the incision line initially, but none since then.  She has developed some crusting around the incision line, but is otherwise healing up well.  She denies any localized pain or tenderness, and she has had no voice or swallowing complaints since surgery.    -Neck incision healing well, mild crusting along incision line, but no exposed sutures or evidence of wound dehiscence  -Strong voice with no stridor or hoarseness.    PATH:  DIAGNOSIS        A:  \" QUESTIONABLE LEFT INFERIOR PARATHYROID\":         ENLARGED PARATHYROID. SEE COMMENT     A/P:   Diagnosis Orders   1. S/P parathyroidectomy ([facility])  Calcium        Doing great since her parathyroidectomy last week.  Her pathology was consistent with a benign left parathyroid adenoma, and her PTH normalized nicely intraoperatively.  Her incision is healing up well despite the early removal of the Steri-Strips.  I reviewed her wound care instructions and will check a calcium today and call her next week with the results.  RTC in 1 month for another wound check.    Richie Luis MD

## 2024-01-20 DIAGNOSIS — E55.9 VITAMIN D DEFICIENCY: ICD-10-CM

## 2024-01-22 RX ORDER — DM/PE/ACETAMINOPHEN/CHLORPHENR 10-5-325-2
1 TABLET, SEQUENTIAL ORAL 2 TIMES DAILY
Qty: 60 TABLET | Refills: 5 | OUTPATIENT
Start: 2024-01-22

## 2024-01-22 RX ORDER — DM/PE/ACETAMINOPHEN/CHLORPHENR 10-5-325-2
1 TABLET, SEQUENTIAL ORAL 2 TIMES DAILY
Qty: 60 TABLET | Refills: 5 | Status: SHIPPED | OUTPATIENT
Start: 2024-01-22

## 2024-01-23 ENCOUNTER — NURSE ONLY (OUTPATIENT)
Dept: RHEUMATOLOGY | Age: 74
End: 2024-01-23
Payer: MEDICARE

## 2024-01-23 VITALS — SYSTOLIC BLOOD PRESSURE: 119 MMHG | TEMPERATURE: 97.2 F | HEART RATE: 69 BPM | DIASTOLIC BLOOD PRESSURE: 72 MMHG

## 2024-01-23 DIAGNOSIS — M81.0 POSTMENOPAUSAL OSTEOPOROSIS: Primary | ICD-10-CM

## 2024-01-23 PROCEDURE — 96372 THER/PROPH/DIAG INJ SC/IM: CPT | Performed by: INTERNAL MEDICINE

## 2024-01-23 NOTE — PROGRESS NOTES
[facility] RHEUMATOLOGY  [address] [place] [address], [address]  [place] [address]  Phone: [phone], Fax: [phone]       Patient here for Evenity # 4. Two separate syringes containing 105 mg/1.17 ml were  injected SC today into the patient's left upper arm.  Patient tolerated the injections well. Advised patient to continue with calcium and vitamin D post injection. Patient instructed to call with any problems post injection.   Medication ordered by Dr. Martinez.     BP: 119/72    Pre-infusion/injection questionairre for osteoporosis    1. Have you had or are you planning any dental work?  NO    2. Are you taking your calcium and vitamin D? YES    3. When was your last osteoporosis treatment? 12/21/23    4. Have you had any recent fractures? NO

## 2024-02-19 ENCOUNTER — OFFICE VISIT (OUTPATIENT)
Dept: ENT CLINIC | Age: 74
End: 2024-02-19

## 2024-02-19 DIAGNOSIS — E89.2 S/P PARATHYROIDECTOMY (HCC): Primary | ICD-10-CM

## 2024-02-19 PROCEDURE — 99024 POSTOP FOLLOW-UP VISIT: CPT | Performed by: OTOLARYNGOLOGY

## 2024-02-19 NOTE — PROGRESS NOTES
Jae Lopez is a 73 y.o. female seen today now over 1 month post-op after undergoing parathyroidectomy back on 1/11/2024.  She had an enlarged left inferior parathyroid gland, consistent with hypercellular parathyroid tissue on frozen section analysis, and her intraoperative PTH level decreased from 184.2 down to 43.2, ten minutes after excision of this adenoma.  Doing great since her last appointment with no further incisional pain, and she denies any voice or swallowing complaints.  She denies any symptoms of hypocalcemia.    -Neck incision healing well, no exposed sutures, no further edema.  Strong voice with no stridor or hoarseness.    Lab Results   Component Value Date    CALCIUM 9.5 01/19/2024    PHOS 3.4 10/05/2023       A/P:   Diagnosis Orders   1. S/P parathyroidectomy (HCC)          Doing great however 1 month out from her parathyroidectomy.  Her incision has healed up nicely, and she looks great on exam today.  Her calcium has normalized-most recently 9.5.  RTC as needed.    Richie Luis MD

## 2024-02-22 RX ORDER — MELOXICAM 7.5 MG/1
7.5 TABLET ORAL DAILY
Qty: 30 TABLET | Refills: 3 | Status: SHIPPED | OUTPATIENT
Start: 2024-02-22

## 2024-02-27 ENCOUNTER — NURSE ONLY (OUTPATIENT)
Dept: RHEUMATOLOGY | Age: 74
End: 2024-02-27
Payer: MEDICARE

## 2024-02-27 VITALS — DIASTOLIC BLOOD PRESSURE: 75 MMHG | HEART RATE: 66 BPM | TEMPERATURE: 97.4 F | SYSTOLIC BLOOD PRESSURE: 133 MMHG

## 2024-02-27 DIAGNOSIS — M81.0 POSTMENOPAUSAL OSTEOPOROSIS: Primary | ICD-10-CM

## 2024-02-27 PROCEDURE — 96372 THER/PROPH/DIAG INJ SC/IM: CPT | Performed by: INTERNAL MEDICINE

## 2024-02-27 NOTE — PROGRESS NOTES
[facility] RHEUMATOLOGY  [address] [place] [address], [address]  [place] [address]  Phone: [phone], Fax: [phone]       Patient here for Evenity # 5 . Two separate syringes containing 105 mg/1.17 ml were  injected SC today into the patient's right  and left arms .  Patient tolerated the injections well. Advised patient to continue with calcium and vitamin D post injection. Patient instructed to call with any problems post injection.   Medication ordered by Dr. Huizar.     BP: 133/75    Pre-infusion/injection questionairre for osteoporosis    1. Have you had or are you planning any dental work?  no    2. Are you taking your calcium and vitamin D? yes    3. When was your last osteoporosis treatment?  1/23/2024     4. Have you had any recent fractures?  no    5. Are you currently in skilled nursing or inpatient rehab? no

## 2024-02-28 DIAGNOSIS — F02.80 EARLY ONSET ALZHEIMER'S DEMENTIA WITHOUT BEHAVIORAL DISTURBANCE (HCC): ICD-10-CM

## 2024-02-28 DIAGNOSIS — G30.0 EARLY ONSET ALZHEIMER'S DEMENTIA WITHOUT BEHAVIORAL DISTURBANCE (HCC): ICD-10-CM

## 2024-02-28 RX ORDER — MEMANTINE HYDROCHLORIDE 10 MG/1
10 TABLET ORAL 2 TIMES DAILY
Qty: 180 TABLET | Refills: 2 | OUTPATIENT
Start: 2024-02-28

## 2024-03-08 RX ORDER — MELOXICAM 7.5 MG/1
7.5 TABLET ORAL DAILY
Qty: 30 TABLET | Refills: 3 | OUTPATIENT
Start: 2024-03-08

## 2024-03-13 ENCOUNTER — OFFICE VISIT (OUTPATIENT)
Dept: NEUROLOGY | Age: 74
End: 2024-03-13

## 2024-03-13 VITALS
HEIGHT: 62 IN | BODY MASS INDEX: 19.51 KG/M2 | DIASTOLIC BLOOD PRESSURE: 80 MMHG | WEIGHT: 106 LBS | HEART RATE: 100 BPM | SYSTOLIC BLOOD PRESSURE: 140 MMHG

## 2024-03-13 DIAGNOSIS — F02.80 EARLY ONSET ALZHEIMER'S DEMENTIA WITHOUT BEHAVIORAL DISTURBANCE (HCC): Primary | ICD-10-CM

## 2024-03-13 DIAGNOSIS — G30.0 EARLY ONSET ALZHEIMER'S DEMENTIA WITHOUT BEHAVIORAL DISTURBANCE (HCC): Primary | ICD-10-CM

## 2024-03-13 DIAGNOSIS — Z79.899 ENCOUNTER FOR MEDICATION MANAGEMENT: ICD-10-CM

## 2024-03-13 NOTE — PROGRESS NOTES
[facility] Neurology [address], [address]  [place] [address]  [phone]      Chief Complaint   Patient presents with    Follow-up     Alzheimer's        HPI  Jae Lopez is a 73 y.o. female who presents for follow up of her Dementia.       Interval History  She is accompanied today by her  and 2 daughters.  Patient is poor historian family helps with history.  They report she has been about the same since last visit.  Some decline in memory that they have expected.  He recently had parathyroid surgery and they feel that some of her clarity may have improved slightly after this.  They are handling all her medications, appointments, and helping with all her activities daily living.       Past Medical History:   Diagnosis Date    Alzheimer's dementia (HCC)     H/O seasonal allergies     Hypercalcemia     Osteoporosis     Primary hyperparathyroidism (HCC)     Vitamin D deficiency        Past Surgical History:   Procedure Laterality Date    BREAST SURGERY      benign breast lump    HYSTERECTOMY (CERVIX STATUS UNKNOWN)  2006    LSH/BSO    GENIA BIOPSY BREAST STEREOTACTIC Right 2004    PARATHYROID GLAND SURGERY N/A 1/11/2024    PARATHYROIDECTOMY performed by Richie Luis MD [facility] MAIN OR    PARATHYROIDECTOMY Left 01/11/2024    Janelle       Family History   Problem Relation Age of Onset    Hypertension Father 60    Elevated Lipids Father     Breast Cancer Neg Hx     Hypertension Mother     Elevated Lipids Mother     Elevated Lipids Maternal Grandmother        Social History     Socioeconomic History    Marital status:      Spouse name: None    Number of children: None    Years of education: None    Highest education level: None   Tobacco Use    Smoking status: Never    Smokeless tobacco: Never   Vaping Use    Vaping Use: Never used   Substance and Sexual Activity    Alcohol use: Not Currently     Alcohol/week: 7.0 standard drinks of alcohol    Drug use: Never

## 2024-03-27 ENCOUNTER — NURSE ONLY (OUTPATIENT)
Dept: RHEUMATOLOGY | Age: 74
End: 2024-03-27
Payer: MEDICARE

## 2024-03-27 VITALS — DIASTOLIC BLOOD PRESSURE: 73 MMHG | TEMPERATURE: 97.4 F | HEART RATE: 67 BPM | SYSTOLIC BLOOD PRESSURE: 117 MMHG

## 2024-03-27 DIAGNOSIS — M81.0 POSTMENOPAUSAL OSTEOPOROSIS: Primary | ICD-10-CM

## 2024-03-27 PROCEDURE — 96372 THER/PROPH/DIAG INJ SC/IM: CPT | Performed by: INTERNAL MEDICINE

## 2024-03-27 NOTE — PROGRESS NOTES
[facility] RHEUMATOLOGY  [address] [place] [address], [address]  [place] [address]  Phone: [phone], Fax: [phone]       Patient here for Evenity # 6 . Two separate syringes containing 105 mg/1.17 ml were  injected SC today into the patient's left  and right arms .  Patient tolerated the injections well. Advised patient to continue with calcium and vitamin D post injection. Patient instructed to call with any problems post injection.   Medication ordered by Dr. Martinez.     BP: 117/73    Pre-infusion/injection questionairre for osteoporosis    1. Have you had or are you planning any dental work?  no    2. Are you taking your calcium and vitamin D? yes    3. When was your last osteoporosis treatment?  2/27/2024    4. Have you had any recent fractures?  no    5. Are you currently in skilled nursing or inpatient rehab? no

## 2024-04-04 ENCOUNTER — OFFICE VISIT (OUTPATIENT)
Dept: RHEUMATOLOGY | Age: 74
End: 2024-04-04
Payer: MEDICARE

## 2024-04-04 VITALS
HEIGHT: 62 IN | RESPIRATION RATE: 14 BRPM | DIASTOLIC BLOOD PRESSURE: 54 MMHG | BODY MASS INDEX: 19.69 KG/M2 | HEART RATE: 54 BPM | OXYGEN SATURATION: 99 % | SYSTOLIC BLOOD PRESSURE: 82 MMHG | WEIGHT: 107 LBS

## 2024-04-04 DIAGNOSIS — M81.0 POSTMENOPAUSAL OSTEOPOROSIS: Primary | ICD-10-CM

## 2024-04-04 PROCEDURE — 1036F TOBACCO NON-USER: CPT | Performed by: INTERNAL MEDICINE

## 2024-04-04 PROCEDURE — 1123F ACP DISCUSS/DSCN MKR DOCD: CPT | Performed by: INTERNAL MEDICINE

## 2024-04-04 PROCEDURE — 3017F COLORECTAL CA SCREEN DOC REV: CPT | Performed by: INTERNAL MEDICINE

## 2024-04-04 PROCEDURE — 1090F PRES/ABSN URINE INCON ASSESS: CPT | Performed by: INTERNAL MEDICINE

## 2024-04-04 PROCEDURE — G8420 CALC BMI NORM PARAMETERS: HCPCS | Performed by: INTERNAL MEDICINE

## 2024-04-04 PROCEDURE — G8399 PT W/DXA RESULTS DOCUMENT: HCPCS | Performed by: INTERNAL MEDICINE

## 2024-04-04 PROCEDURE — G8427 DOCREV CUR MEDS BY ELIG CLIN: HCPCS | Performed by: INTERNAL MEDICINE

## 2024-04-04 PROCEDURE — 99213 OFFICE O/P EST LOW 20 MIN: CPT | Performed by: INTERNAL MEDICINE

## 2024-04-04 NOTE — PROGRESS NOTES
"Encounter Date: 7/29/2020       History     Chief Complaint   Patient presents with    Psychiatric Evaluation     Pt to [facility] via EMS with reports "being off her meds for a few days." Pt states SI without a plan.     Patient currently presents via EMS with concern regarding behavioral disturbance.  Patient has a history of schizophrenia with depression requiring frequent inpatient hospitalizations.  Patient initially reports that she wanted to cut her throat but is laughing when she says that and appears to be largely inattentive to questioning.  Patient ultimately admits that she simply wants to get out of her house because she does not like staying with her family.  When asked if the patient actually wanted to harm herself, she remains vague and unwilling to offer a solid answer describing that she would rather go to a psychiatric facility than go home.  Patient largely uncooperative with questioning regarding medication compliance and treatment.        Review of patient's allergies indicates:  No Known Allergies  Past Medical History:   Diagnosis Date    Anxiety     Bipolar 1 disorder     Depression     Schizophrenia      No past surgical history on file.  Family History   Family history unknown: Yes     Social History     Tobacco Use    Smoking status: Smoker, Current Status Unknown     Packs/day: 1.00     Types: Cigarettes    Smokeless tobacco: Never Used   Substance Use Topics    Alcohol use: Yes    Drug use: Yes     Types: Marijuana     Review of Systems   Unable to perform ROS: Psychiatric disorder       Physical Exam     Initial Vitals [07/29/20 2026]   BP Pulse Resp Temp SpO2   117/80 (!) 56 20 97.6 °F (36.4 °C) 97 %      MAP       --         Physical Exam    Nursing note and vitals reviewed.  Constitutional: She appears well-developed and well-nourished. She is not diaphoretic. No distress.   HENT:   Head: Normocephalic and atraumatic.   Right Ear: External ear normal.   Left Ear: External ear " [facility] RHEUMATOLOGY  MELITON Martinez M.D.     Phone: [phone]   Fax: [phone]    4/4/2024 1:14 PM      SUBJECTIVE: Per previous not from Dr. Martinez on 10/5/2023:      Jae Lopez is a 73 y.o. female referred for osteoporosis: As noted below she had a recent DEXA scan in July 2023 that showed her to be very high risk for fracture.  She been on vitamin D 2000 IUs a day for years but no calcium supplements.  She had a DEXA scan done also noted below 2018 that showed moderate risk but she was not put on any calcium supplements at that time.  She had no fractures and normal menopause.  No family history of fractures no long-term steroids no seizure treatment no cancer treatment no diabetes no endocrinopathy.  No chronic renal disease or nephrolithiasis.  No GI problems.  Only risk factor appears to be postmenopausal and age-related changes in bone density.  She did have 1 low serum vitamin D several years ago but was put on the above-noted vitamin D and has therapeutic levels...... I do note in the lab she has had several elevated calcium levels over the last couple years most recent one was little bit over 11 so is a possibility she is got hyperparathyroidism.    First romosozumab: 10/7/2023    Since last visit of 10/5/2023:    Osteoporosis:she got her first romosozumab injection 10/17/2023 and got her 6-month on 3/27/2024.  She did have a parathyroidectomy done in January 2024 he has recovered well.  I do not think she is on appropriate calcium and again I gave her information on Caltrate and has to take 1 twice a day at mealtime and continue the vitamin D she is on.  No trouble with the injections at this point    LAB:    10/25/2023: Magnesium slightly elevated at 2.7, vitamin D 52.2.  P1 NP slightly elevated 109.1 normal being up to 97.8.  Calcium 10.8 slightly elevated PTH normal  6/ 9/2023: .6 otherwise unremarkable CBC CMP showed a calcium is elevated at 11.6, TSH  normal.   Nose: Nose normal.   Mouth/Throat: Oropharynx is clear and moist.   Eyes: Conjunctivae and EOM are normal. Pupils are equal, round, and reactive to light. No scleral icterus.   Neck: Neck supple. No tracheal deviation present. No JVD present.   Cardiovascular: Normal rate, regular rhythm, normal heart sounds and intact distal pulses. Exam reveals no gallop and no friction rub.    No murmur heard.  Pulmonary/Chest: Breath sounds normal. No respiratory distress. She has no wheezes. She has no rhonchi. She has no rales.   Abdominal: Soft. Bowel sounds are normal. She exhibits no distension. There is no abdominal tenderness.   Musculoskeletal: Normal range of motion. No edema.   Neurological: She is alert and oriented to person, place, and time. She has normal strength. No cranial nerve deficit or sensory deficit.   Skin: Skin is warm and dry. No rash noted.   Psychiatric: Her speech is normal and behavior is normal. Her affect is labile. She is not actively hallucinating. Cognition and memory are normal. She expresses impulsivity.   Patient describes suicidal ideation with thoughts of cutting her throat but then later backed off of this threat only later to again begin claiming it again when a discussion regarding the possibility of discharge arose She is inattentive.         ED Course   Procedures  Labs Reviewed   CBC W/ AUTO DIFFERENTIAL - Abnormal; Notable for the following components:       Result Value    RBC 3.92 (*)     Hemoglobin 11.7 (*)     Hematocrit 36.6 (*)     All other components within normal limits   COMPREHENSIVE METABOLIC PANEL - Abnormal; Notable for the following components:    Potassium 3.4 (*)     BUN, Bld 6 (*)     All other components within normal limits   SALICYLATE LEVEL - Abnormal; Notable for the following components:    Salicylate Lvl <5.0 (*)     All other components within normal limits   ACETAMINOPHEN LEVEL   DRUG SCREEN PANEL, URINE EMERGENCY    Narrative:     Specimen  "Source->Urine   ALCOHOL,MEDICAL (ETHANOL)   PREGNANCY TEST, URINE RAPID    Narrative:     Specimen Source->Urine   TSH   SARS-COV-2 RNA AMPLIFICATION, QUAL     EKG Readings: (Independently Interpreted)   Initial Reading: No STEMI. Rhythm: Normal Sinus Rhythm. Heart Rate: 87. Ectopy: No Ectopy. Conduction: Normal. Axis: Normal.       Imaging Results    None          Medical Decision Making:   Initial Assessment:   Although the patient makes claims of suicidal ideation with plan, determining the actual validity of these claims is quite difficult due to her inattentive and uncooperative nature.  Patient is argumentative but appears to be primarily focused on getting out of the family home.  Review of the chart indicates that this has been a frequent complaint of the patient.  Patient also has a history of malingering on a number of occasions.  Notably when a discussion was had indicating that we are still considering the possibility of discharge home pending the outcome of the telepsych evaluation, the patient immediately began shouting that she does not like being at home and started cursing and threatening me and demanded I "get the fuck out."  We are currently awaiting the telepsych evaluation for further recommendations.  ED Management:  Patient was seen and evaluated by Dr. Last of telemedicine psychiatry who feels that the patient is appropriate for outpatient disposition and follow up with her psychiatrist Dr. Tavares as scheduled in the a.m..  He also had an opportunity to speak with patient's mother who confirmed that the patient was simply trying to get out of the home noting that she had already packed a bag with the intent to go somewhere else and only claimed to be suicidal when she was told not to leave.  The patient did ultimately recant her report of SI.    All findings were reviewed with the patient/family in detail.  I see no indication of an emergent process beyond that addressed during our " encounter but have duly counseled the patient/family regarding the need for prompt follow-up as well as the indications that should prompt immediate return to the emergency room should new or worrisome developments occur.  The patient has additionally been provided with printed information regarding diagnosis as well as instructions regarding follow up and any medications intended to manage the patient's aforementioned conditions.  All remaining questions and concerns were addressed at this time.                                       Clinical Impression:       ICD-10-CM ICD-9-CM   1. Behavior disturbance  F91.9 312.9   2. Encounter for psychological evaluation  Z00.8 V72.85   3. Schizophrenia, unspecified type  F20.9 295.90   4. Malingering  Z76.5 V65.2                                Jason Marcum MD  07/30/20 0557       Jason Marcum MD  07/30/20 0558

## 2024-04-10 ENCOUNTER — TELEPHONE (OUTPATIENT)
Dept: NEUROLOGY | Age: 74
End: 2024-04-10

## 2024-04-10 DIAGNOSIS — F02.80 EARLY ONSET ALZHEIMER'S DEMENTIA WITHOUT BEHAVIORAL DISTURBANCE (HCC): Primary | ICD-10-CM

## 2024-04-10 DIAGNOSIS — G30.0 EARLY ONSET ALZHEIMER'S DEMENTIA WITHOUT BEHAVIORAL DISTURBANCE (HCC): Primary | ICD-10-CM

## 2024-04-25 RX ORDER — EPINEPHRINE 1 MG/ML
0.3 INJECTION, SOLUTION, CONCENTRATE INTRAVENOUS PRN
OUTPATIENT
Start: 2024-04-30

## 2024-04-25 RX ORDER — SODIUM CHLORIDE 9 MG/ML
INJECTION, SOLUTION INTRAVENOUS CONTINUOUS
OUTPATIENT
Start: 2024-04-30

## 2024-04-25 RX ORDER — FAMOTIDINE 10 MG/ML
20 INJECTION, SOLUTION INTRAVENOUS
OUTPATIENT
Start: 2024-04-30

## 2024-04-25 RX ORDER — DIPHENHYDRAMINE HYDROCHLORIDE 50 MG/ML
50 INJECTION INTRAMUSCULAR; INTRAVENOUS
OUTPATIENT
Start: 2024-04-30

## 2024-04-25 RX ORDER — ONDANSETRON 2 MG/ML
8 INJECTION INTRAMUSCULAR; INTRAVENOUS
OUTPATIENT
Start: 2024-04-30

## 2024-04-25 RX ORDER — ACETAMINOPHEN 325 MG/1
650 TABLET ORAL
OUTPATIENT
Start: 2024-04-30

## 2024-04-25 RX ORDER — ALBUTEROL SULFATE 90 UG/1
4 AEROSOL, METERED RESPIRATORY (INHALATION) PRN
OUTPATIENT
Start: 2024-04-30

## 2024-04-30 ENCOUNTER — NURSE ONLY (OUTPATIENT)
Dept: RHEUMATOLOGY | Age: 74
End: 2024-04-30

## 2024-04-30 VITALS — HEART RATE: 63 BPM | TEMPERATURE: 97.3 F | SYSTOLIC BLOOD PRESSURE: 117 MMHG | DIASTOLIC BLOOD PRESSURE: 69 MMHG

## 2024-04-30 DIAGNOSIS — M81.0 AGE-RELATED OSTEOPOROSIS WITHOUT CURRENT PATHOLOGICAL FRACTURE: Primary | ICD-10-CM

## 2024-04-30 RX ORDER — EPINEPHRINE 1 MG/ML
0.3 INJECTION, SOLUTION, CONCENTRATE INTRAVENOUS PRN
OUTPATIENT
Start: 2024-05-30

## 2024-04-30 RX ORDER — SODIUM CHLORIDE 9 MG/ML
INJECTION, SOLUTION INTRAVENOUS CONTINUOUS
OUTPATIENT
Start: 2024-05-30

## 2024-04-30 RX ORDER — ALBUTEROL SULFATE 90 UG/1
4 AEROSOL, METERED RESPIRATORY (INHALATION) PRN
OUTPATIENT
Start: 2024-05-30

## 2024-04-30 RX ORDER — ACETAMINOPHEN 325 MG/1
650 TABLET ORAL
OUTPATIENT
Start: 2024-05-30

## 2024-04-30 RX ORDER — ONDANSETRON 2 MG/ML
8 INJECTION INTRAMUSCULAR; INTRAVENOUS
OUTPATIENT
Start: 2024-05-30

## 2024-04-30 RX ORDER — DIPHENHYDRAMINE HYDROCHLORIDE 50 MG/ML
50 INJECTION INTRAMUSCULAR; INTRAVENOUS
OUTPATIENT
Start: 2024-05-30

## 2024-04-30 NOTE — PROGRESS NOTES
[facility] RHEUMATOLOGY  [address] [place] [address], [address]  [place] [address]  Phone: [phone], Fax: [phone]       Patient here for Evenity # 7. Two separate syringes containing 105 mg/1.17 ml were  injected SC today into the patient's right upper arm.  Patient tolerated the injections well. Advised patient to continue with calcium and vitamin D post injection. Patient instructed to call with any problems post injection.   Medication ordered by Dr. Martinez.     BP: 117/69    Pre-infusion/injection questionairre for osteoporosis    1. Have you had or are you planning any dental work?  NO    2. Are you taking your calcium and vitamin D? YES    3. When was your last osteoporosis treatment? 3/27/24    4. Have you had any recent fractures? NO    5. Are you currently in skilled nursing or inpatient rehab? NO

## 2024-05-20 DIAGNOSIS — G30.0 EARLY ONSET ALZHEIMER'S DEMENTIA WITHOUT BEHAVIORAL DISTURBANCE (HCC): ICD-10-CM

## 2024-05-20 DIAGNOSIS — F02.80 EARLY ONSET ALZHEIMER'S DEMENTIA WITHOUT BEHAVIORAL DISTURBANCE (HCC): ICD-10-CM

## 2024-05-21 RX ORDER — MEMANTINE HYDROCHLORIDE 10 MG/1
10 TABLET ORAL 2 TIMES DAILY
Qty: 180 TABLET | Refills: 2 | OUTPATIENT
Start: 2024-05-21

## 2024-05-22 DIAGNOSIS — G30.0 EARLY ONSET ALZHEIMER'S DEMENTIA WITHOUT BEHAVIORAL DISTURBANCE (HCC): ICD-10-CM

## 2024-05-22 DIAGNOSIS — F02.80 EARLY ONSET ALZHEIMER'S DEMENTIA WITHOUT BEHAVIORAL DISTURBANCE (HCC): ICD-10-CM

## 2024-05-23 RX ORDER — MEMANTINE HYDROCHLORIDE 10 MG/1
10 TABLET ORAL 2 TIMES DAILY
Qty: 180 TABLET | Refills: 2 | Status: SHIPPED | OUTPATIENT
Start: 2024-05-23

## 2024-05-28 RX ORDER — DULOXETIN HYDROCHLORIDE 30 MG/1
30 CAPSULE, DELAYED RELEASE ORAL DAILY
Qty: 30 CAPSULE | Refills: 5 | OUTPATIENT
Start: 2024-05-28

## 2024-05-29 RX ORDER — DULOXETIN HYDROCHLORIDE 30 MG/1
30 CAPSULE, DELAYED RELEASE ORAL DAILY
Qty: 30 CAPSULE | Refills: 0 | Status: SHIPPED | OUTPATIENT
Start: 2024-05-29

## 2024-05-29 RX ORDER — MELOXICAM 7.5 MG/1
7.5 TABLET ORAL DAILY
Qty: 30 TABLET | Refills: 3 | Status: SHIPPED | OUTPATIENT
Start: 2024-05-29

## 2024-05-30 ENCOUNTER — NURSE ONLY (OUTPATIENT)
Dept: RHEUMATOLOGY | Age: 74
End: 2024-05-30
Payer: MEDICARE

## 2024-05-30 VITALS — TEMPERATURE: 97.7 F | DIASTOLIC BLOOD PRESSURE: 75 MMHG | SYSTOLIC BLOOD PRESSURE: 109 MMHG | HEART RATE: 68 BPM

## 2024-05-30 DIAGNOSIS — M81.0 AGE-RELATED OSTEOPOROSIS WITHOUT CURRENT PATHOLOGICAL FRACTURE: Primary | ICD-10-CM

## 2024-05-30 PROCEDURE — 96372 THER/PROPH/DIAG INJ SC/IM: CPT | Performed by: INTERNAL MEDICINE

## 2024-05-30 RX ORDER — ALBUTEROL SULFATE 90 UG/1
4 AEROSOL, METERED RESPIRATORY (INHALATION) PRN
OUTPATIENT
Start: 2024-06-29

## 2024-05-30 RX ORDER — EPINEPHRINE 1 MG/ML
0.3 INJECTION, SOLUTION, CONCENTRATE INTRAVENOUS PRN
OUTPATIENT
Start: 2024-06-29

## 2024-05-30 RX ORDER — ONDANSETRON 2 MG/ML
8 INJECTION INTRAMUSCULAR; INTRAVENOUS
OUTPATIENT
Start: 2024-06-29

## 2024-05-30 RX ORDER — SODIUM CHLORIDE 9 MG/ML
INJECTION, SOLUTION INTRAVENOUS CONTINUOUS
OUTPATIENT
Start: 2024-06-29

## 2024-05-30 RX ORDER — DIPHENHYDRAMINE HYDROCHLORIDE 50 MG/ML
50 INJECTION INTRAMUSCULAR; INTRAVENOUS
OUTPATIENT
Start: 2024-06-29

## 2024-05-30 RX ORDER — ACETAMINOPHEN 325 MG/1
650 TABLET ORAL
OUTPATIENT
Start: 2024-06-29

## 2024-05-30 NOTE — PROGRESS NOTES
[facility] RHEUMATOLOGY  [address] [place] [address], [address]  [place] [address]  Phone: [phone], Fax: [phone]       Patient here for Evenity # 8. Two separate syringes containing 105 mg/1.17 ml were  injected SC today into the patient's left upper arm.  Patient tolerated the injections well. Advised patient to continue with calcium and vitamin D post injection. Patient instructed to call with any problems post injection.   Medication ordered by Dr. Martinez.     BP: 109/75    Pre-infusion/injection questionairre for osteoporosis    1. Have you had or are you planning any dental work?  NO    2. Are you taking your calcium and vitamin D? YES    3. When was your last osteoporosis treatment? 4/30/24    4. Have you had any recent fractures? NO    5. Are you currently in skilled nursing or inpatient rehab? NO

## 2024-06-06 RX ORDER — DULOXETIN HYDROCHLORIDE 30 MG/1
30 CAPSULE, DELAYED RELEASE ORAL DAILY
Qty: 30 CAPSULE | Refills: 5 | OUTPATIENT
Start: 2024-06-06

## 2024-06-27 RX ORDER — DULOXETIN HYDROCHLORIDE 30 MG/1
30 CAPSULE, DELAYED RELEASE ORAL DAILY
Qty: 30 CAPSULE | Refills: 3 | Status: SHIPPED | OUTPATIENT
Start: 2024-06-27

## 2024-07-02 ENCOUNTER — NURSE ONLY (OUTPATIENT)
Dept: RHEUMATOLOGY | Age: 74
End: 2024-07-02
Payer: MEDICARE

## 2024-07-02 VITALS — HEART RATE: 71 BPM | SYSTOLIC BLOOD PRESSURE: 108 MMHG | TEMPERATURE: 97.9 F | DIASTOLIC BLOOD PRESSURE: 71 MMHG

## 2024-07-02 DIAGNOSIS — M81.0 AGE-RELATED OSTEOPOROSIS WITHOUT CURRENT PATHOLOGICAL FRACTURE: Primary | ICD-10-CM

## 2024-07-02 PROCEDURE — 96372 THER/PROPH/DIAG INJ SC/IM: CPT | Performed by: INTERNAL MEDICINE

## 2024-07-02 RX ORDER — ACETAMINOPHEN 325 MG/1
650 TABLET ORAL
OUTPATIENT
Start: 2024-07-29

## 2024-07-02 RX ORDER — ONDANSETRON 2 MG/ML
8 INJECTION INTRAMUSCULAR; INTRAVENOUS
OUTPATIENT
Start: 2024-07-29

## 2024-07-02 RX ORDER — DIPHENHYDRAMINE HYDROCHLORIDE 50 MG/ML
50 INJECTION INTRAMUSCULAR; INTRAVENOUS
OUTPATIENT
Start: 2024-07-29

## 2024-07-02 RX ORDER — ALBUTEROL SULFATE 90 UG/1
4 AEROSOL, METERED RESPIRATORY (INHALATION) PRN
OUTPATIENT
Start: 2024-07-29

## 2024-07-02 RX ORDER — SODIUM CHLORIDE 9 MG/ML
INJECTION, SOLUTION INTRAVENOUS CONTINUOUS
OUTPATIENT
Start: 2024-07-29

## 2024-07-02 RX ORDER — EPINEPHRINE 1 MG/ML
0.3 INJECTION, SOLUTION, CONCENTRATE INTRAVENOUS PRN
OUTPATIENT
Start: 2024-07-29

## 2024-07-02 NOTE — PROGRESS NOTES
[facility] RHEUMATOLOGY  [address] [place] [address], [address]  [place] [address]  Phone: [phone], Fax: [phone]       Patient here for Evenity # 9. Two separate syringes containing 105 mg/1.17 ml were  injected SC today into the patient's left upper arm.  Patient tolerated the injections well. Advised patient to continue with calcium and vitamin D post injection. Patient instructed to call with any problems post injection.   Medication ordered by Dr. Martinez.     BP: 108/71    Pre-infusion/injection questionairre for osteoporosis    1. Have you had or are you planning any dental work?  NO    2. Are you taking your calcium and vitamin D? YES    3. When was your last osteoporosis treatment? 5/30/24    4. Have you had any recent fractures? NO    5. Are you currently in skilled nursing or inpatient rehab?

## 2024-07-03 ENCOUNTER — OFFICE VISIT (OUTPATIENT)
Dept: ENDOCRINOLOGY | Age: 74
End: 2024-07-03
Payer: MEDICARE

## 2024-07-03 VITALS
BODY MASS INDEX: 19.94 KG/M2 | HEART RATE: 80 BPM | DIASTOLIC BLOOD PRESSURE: 80 MMHG | OXYGEN SATURATION: 98 % | SYSTOLIC BLOOD PRESSURE: 122 MMHG | WEIGHT: 109 LBS

## 2024-07-03 DIAGNOSIS — E55.9 VITAMIN D DEFICIENCY: ICD-10-CM

## 2024-07-03 DIAGNOSIS — E04.1 THYROID NODULE: ICD-10-CM

## 2024-07-03 DIAGNOSIS — M81.0 AGE-RELATED OSTEOPOROSIS WITHOUT CURRENT PATHOLOGICAL FRACTURE: ICD-10-CM

## 2024-07-03 DIAGNOSIS — Z86.39 HISTORY OF PRIMARY HYPERPARATHYROIDISM: ICD-10-CM

## 2024-07-03 DIAGNOSIS — Z86.39 HISTORY OF PRIMARY HYPERPARATHYROIDISM: Primary | ICD-10-CM

## 2024-07-03 LAB
25(OH)D3 SERPL-MCNC: 63.2 NG/ML (ref 30–100)
ANION GAP SERPL CALC-SCNC: 10 MMOL/L (ref 9–18)
BUN SERPL-MCNC: 23 MG/DL (ref 8–23)
CALCIUM SERPL-MCNC: 9.5 MG/DL (ref 8.8–10.2)
CALCIUM SERPL-MCNC: 9.6 MG/DL (ref 8.8–10.2)
CHLORIDE SERPL-SCNC: 104 MMOL/L (ref 98–107)
CO2 SERPL-SCNC: 27 MMOL/L (ref 20–28)
CREAT SERPL-MCNC: 0.67 MG/DL (ref 0.6–1.1)
GLUCOSE SERPL-MCNC: 136 MG/DL (ref 70–99)
POTASSIUM SERPL-SCNC: 4.3 MMOL/L (ref 3.5–5.1)
PTH-INTACT SERPL-MCNC: 29 PG/ML (ref 15–65)
SODIUM SERPL-SCNC: 141 MMOL/L (ref 136–145)
TSH W FREE THYROID IF ABNORMAL: 1.02 UIU/ML (ref 0.27–4.2)

## 2024-07-03 PROCEDURE — 99214 OFFICE O/P EST MOD 30 MIN: CPT | Performed by: INTERNAL MEDICINE

## 2024-07-03 PROCEDURE — G8399 PT W/DXA RESULTS DOCUMENT: HCPCS | Performed by: INTERNAL MEDICINE

## 2024-07-03 PROCEDURE — 3017F COLORECTAL CA SCREEN DOC REV: CPT | Performed by: INTERNAL MEDICINE

## 2024-07-03 PROCEDURE — G8420 CALC BMI NORM PARAMETERS: HCPCS | Performed by: INTERNAL MEDICINE

## 2024-07-03 PROCEDURE — G8427 DOCREV CUR MEDS BY ELIG CLIN: HCPCS | Performed by: INTERNAL MEDICINE

## 2024-07-03 PROCEDURE — 1036F TOBACCO NON-USER: CPT | Performed by: INTERNAL MEDICINE

## 2024-07-03 PROCEDURE — 1090F PRES/ABSN URINE INCON ASSESS: CPT | Performed by: INTERNAL MEDICINE

## 2024-07-03 PROCEDURE — 1123F ACP DISCUSS/DSCN MKR DOCD: CPT | Performed by: INTERNAL MEDICINE

## 2024-07-03 RX ORDER — DM/PE/ACETAMINOPHEN/CHLORPHENR 10-5-325-2
1 TABLET, SEQUENTIAL ORAL 2 TIMES DAILY
Qty: 60 TABLET | Refills: 5
Start: 2024-07-03

## 2024-07-03 ASSESSMENT — ENCOUNTER SYMPTOMS: SHORTNESS OF BREATH: 0

## 2024-07-03 NOTE — PROGRESS NOTES
Mello Luong MD, FACE  [facility]  [address] [place] [address], [address]  [place] [address]  Phone [phone]  Facsimile [phone]          Jae Lopez is a 74 y.o. female seen 7/3/2024 for follow-up of primary hyperparathyroidism        ASSESSMENT AND PLAN:    1. History of primary hyperparathyroidism  Status post left inferior parathyroidectomy 1/2024.  I will assess her for cure today.  I recommend monitoring her for recurrence once a year by checking a serum calcium level.  I will defer this to her primary care physician.    2. Vitamin D deficiency  I will assess her vitamin D level today.    - Cholecalciferol (GNP VITAMIN D MAXIMUM STRENGTH) 50 MCG (2000 UT) TABS; Take 1 tablet by mouth 2 times daily  Dispense: 60 tablet; Refill: 5    3. Age-related osteoporosis without current pathological fracture  Followed by Dr. Martinez with rheumatology.  She is currently receiving Evenity injections.  Hopefully parathyroidectomy will help.    4. Thyroid nodule  Neck ultrasound 11/2023 revealed the presence of a small TI-RADS level 3 nodule in the right lobe.  No further imaging surveillance is necessary per ACR TI-RADS guidelines.      Follow-up and Dispositions    Return As needed.         HISTORY OF PRESENT ILLNESS:    HYPERCALCEMIA    Presentation/Diagnosis: Primary hyperparathyroidism status post left inferior parathyroidectomy 1/11/2024    Risk Factors:  She takes OTC calcium (dose unknown).  She takes vitamin D3 2000 units twice a day.  Denies the use of thiazides, lithium.  No family history of hypercalcemia, hyperparathyroidism, multiple endocrine neoplasia.    Symptoms: No history of kidney stones.  Her  reports no improvement in her fatigue or memory following parathyroidectomy.  Denies constipation, abdominal pain, depression.  She has Alzheimer's dementia.  Denies deep bone pain.  No history of fragility fractures.  She has osteoporosis followed

## 2024-07-13 DIAGNOSIS — R41.3 MEMORY LOSS OR IMPAIRMENT: ICD-10-CM

## 2024-07-15 RX ORDER — DONEPEZIL HYDROCHLORIDE 10 MG/1
10 TABLET, FILM COATED ORAL NIGHTLY
Qty: 90 TABLET | Refills: 2 | OUTPATIENT
Start: 2024-07-15

## 2024-07-16 RX ORDER — DONEPEZIL HYDROCHLORIDE 10 MG/1
10 TABLET, FILM COATED ORAL NIGHTLY
Qty: 90 TABLET | Refills: 2 | Status: SHIPPED | OUTPATIENT
Start: 2024-07-16

## 2024-07-23 DIAGNOSIS — E55.9 VITAMIN D DEFICIENCY: ICD-10-CM

## 2024-07-23 RX ORDER — CHOLECALCIFEROL (VITAMIN D3) 50 MCG
2000 CAPSULE ORAL 2 TIMES DAILY
Qty: 60 CAPSULE | Refills: 5 | OUTPATIENT
Start: 2024-07-23

## 2024-07-23 RX ORDER — DM/PE/ACETAMINOPHEN/CHLORPHENR 10-5-325-2
1 TABLET, SEQUENTIAL ORAL 2 TIMES DAILY
Qty: 60 TABLET | Refills: 5 | Status: SHIPPED | OUTPATIENT
Start: 2024-07-23

## 2024-08-08 ENCOUNTER — OFFICE VISIT (OUTPATIENT)
Dept: RHEUMATOLOGY | Age: 74
End: 2024-08-08
Payer: MEDICARE

## 2024-08-08 ENCOUNTER — NURSE ONLY (OUTPATIENT)
Dept: RHEUMATOLOGY | Age: 74
End: 2024-08-08

## 2024-08-08 VITALS — SYSTOLIC BLOOD PRESSURE: 122 MMHG | DIASTOLIC BLOOD PRESSURE: 76 MMHG | TEMPERATURE: 97 F | HEART RATE: 80 BPM

## 2024-08-08 VITALS
BODY MASS INDEX: 20.43 KG/M2 | SYSTOLIC BLOOD PRESSURE: 122 MMHG | HEIGHT: 62 IN | WEIGHT: 111 LBS | DIASTOLIC BLOOD PRESSURE: 76 MMHG | RESPIRATION RATE: 16 BRPM | HEART RATE: 80 BPM

## 2024-08-08 DIAGNOSIS — M81.0 AGE-RELATED OSTEOPOROSIS WITHOUT CURRENT PATHOLOGICAL FRACTURE: Primary | ICD-10-CM

## 2024-08-08 DIAGNOSIS — M81.0 POSTMENOPAUSAL OSTEOPOROSIS: Primary | ICD-10-CM

## 2024-08-08 PROCEDURE — 99214 OFFICE O/P EST MOD 30 MIN: CPT | Performed by: INTERNAL MEDICINE

## 2024-08-08 PROCEDURE — G8427 DOCREV CUR MEDS BY ELIG CLIN: HCPCS | Performed by: INTERNAL MEDICINE

## 2024-08-08 PROCEDURE — 1123F ACP DISCUSS/DSCN MKR DOCD: CPT | Performed by: INTERNAL MEDICINE

## 2024-08-08 PROCEDURE — 1090F PRES/ABSN URINE INCON ASSESS: CPT | Performed by: INTERNAL MEDICINE

## 2024-08-08 PROCEDURE — 1036F TOBACCO NON-USER: CPT | Performed by: INTERNAL MEDICINE

## 2024-08-08 PROCEDURE — 3017F COLORECTAL CA SCREEN DOC REV: CPT | Performed by: INTERNAL MEDICINE

## 2024-08-08 PROCEDURE — G8399 PT W/DXA RESULTS DOCUMENT: HCPCS | Performed by: INTERNAL MEDICINE

## 2024-08-08 PROCEDURE — G8420 CALC BMI NORM PARAMETERS: HCPCS | Performed by: INTERNAL MEDICINE

## 2024-08-08 RX ORDER — EPINEPHRINE 1 MG/ML
0.3 INJECTION, SOLUTION, CONCENTRATE INTRAVENOUS PRN
OUTPATIENT
Start: 2024-08-28

## 2024-08-08 RX ORDER — ONDANSETRON 2 MG/ML
8 INJECTION INTRAMUSCULAR; INTRAVENOUS
OUTPATIENT
Start: 2024-08-28

## 2024-08-08 RX ORDER — ROMOSOZUMAB-AQQG 105 MG/1.17ML
210 INJECTION, SOLUTION SUBCUTANEOUS ONCE
COMMUNITY

## 2024-08-08 RX ORDER — SODIUM CHLORIDE 9 MG/ML
INJECTION, SOLUTION INTRAVENOUS CONTINUOUS
OUTPATIENT
Start: 2024-08-28

## 2024-08-08 RX ORDER — ALBUTEROL SULFATE 90 UG/1
4 AEROSOL, METERED RESPIRATORY (INHALATION) PRN
OUTPATIENT
Start: 2024-08-28

## 2024-08-08 RX ORDER — ACETAMINOPHEN 325 MG/1
650 TABLET ORAL
OUTPATIENT
Start: 2024-08-28

## 2024-08-08 RX ORDER — DIPHENHYDRAMINE HYDROCHLORIDE 50 MG/ML
50 INJECTION INTRAMUSCULAR; INTRAVENOUS
OUTPATIENT
Start: 2024-08-28

## 2024-08-08 NOTE — PROGRESS NOTES
[facility] RHEUMATOLOGY  MELITON Martinez M.D.     Phone: [phone]   Fax: [phone]    8/8/2024 1:06 PM      SUBJECTIVE: Per previous not from Dr. Martinez on 10/5/2023:      Jae Lopez is a 74 y.o. female referred for osteoporosis: As noted below she had a recent DEXA scan in July 2023 that showed her to be very high risk for fracture.  She been on vitamin D 2000 IUs a day for years but no calcium supplements.  She had a DEXA scan done also noted below 2018 that showed moderate risk but she was not put on any calcium supplements at that time.  She had no fractures and normal menopause.  No family history of fractures no long-term steroids no seizure treatment no cancer treatment no diabetes no endocrinopathy.  No chronic renal disease or nephrolithiasis.  No GI problems.  Only risk factor appears to be postmenopausal and age-related changes in bone density.  She did have 1 low serum vitamin D several years ago but was put on the above-noted vitamin D and has therapeutic levels...... I do note in the lab she has had several elevated calcium levels over the last couple years most recent one was little bit over 11 so is a possibility she is got hyperparathyroidism.    First romosozumab: 10/7/2023    Since last visit of 4/4/2024    Osteoporosis: She got her ninth dose of Zometa from 7/2/2024.  She got her first romosozumab injection 10/17/2023 and got her 6-month on 3/27/2024.  She did have a parathyroidectomy done in January 2024 he has recovered well.  Again she is not on appropriate counseling taking only 1 calcium carbonate daily.  Vascular go to 1 twice a day at mealtime.  She is on vitamin D 2000 IUs a day.  Otherwise she stable no falling episodes no fractures.    Primary hyperparathyroidism.  She had a parathyroid adenoma removed from the left lower lobe.  Subsequently normal PTH levels.    LAB:  7/3/2024: Blood sugar 136 otherwise unremarkable BMP.  PTH and calcium normal vitamin D

## 2024-08-08 NOTE — PROGRESS NOTES
[facility] RHEUMATOLOGY  [address] [place] [address], [address]  [place] [address]  Phone: [phone], Fax: [phone]       Patient here for Evenity # 10. Two separate syringes containing 105 mg/1.17 ml were  injected SC today into the patient's right and left arms.  Patient tolerated the injections well. Advised patient to continue with calcium and vitamin D post injection. Patient instructed to call with any problems post injection.   Medication ordered by Dr. Martinez.     Height: 1.575 m (5' 2\"), Weight - Scale: 50.3 kg (111 lb), BP: 122/76    Pre-infusion/injection questionairre for osteoporosis    1. Have you had or are you planning any dental work? No      2. Are you taking your calcium and vitamin D? Yes    3. When was your last osteoporosis treatment? 7/2/2024    4. Have you had any recent fractures? No    5. Are you currently in skilled nursing or inpatient rehab? No

## 2024-08-26 SDOH — ECONOMIC STABILITY: INCOME INSECURITY: HOW HARD IS IT FOR YOU TO PAY FOR THE VERY BASICS LIKE FOOD, HOUSING, MEDICAL CARE, AND HEATING?: PATIENT DECLINED

## 2024-08-26 SDOH — ECONOMIC STABILITY: FOOD INSECURITY: WITHIN THE PAST 12 MONTHS, THE FOOD YOU BOUGHT JUST DIDN'T LAST AND YOU DIDN'T HAVE MONEY TO GET MORE.: PATIENT DECLINED

## 2024-08-26 SDOH — ECONOMIC STABILITY: TRANSPORTATION INSECURITY
IN THE PAST 12 MONTHS, HAS LACK OF TRANSPORTATION KEPT YOU FROM MEETINGS, WORK, OR FROM GETTING THINGS NEEDED FOR DAILY LIVING?: NO

## 2024-08-26 SDOH — HEALTH STABILITY: PHYSICAL HEALTH
ON AVERAGE, HOW MANY DAYS PER WEEK DO YOU ENGAGE IN MODERATE TO STRENUOUS EXERCISE (LIKE A BRISK WALK)?: PATIENT DECLINED

## 2024-08-26 SDOH — ECONOMIC STABILITY: FOOD INSECURITY: WITHIN THE PAST 12 MONTHS, YOU WORRIED THAT YOUR FOOD WOULD RUN OUT BEFORE YOU GOT MONEY TO BUY MORE.: PATIENT DECLINED

## 2024-08-26 ASSESSMENT — PATIENT HEALTH QUESTIONNAIRE - PHQ9
7. TROUBLE CONCENTRATING ON THINGS, SUCH AS READING THE NEWSPAPER OR WATCHING TELEVISION: MORE THAN HALF THE DAYS
SUM OF ALL RESPONSES TO PHQ QUESTIONS 1-9: 5
1. LITTLE INTEREST OR PLEASURE IN DOING THINGS: SEVERAL DAYS
4. FEELING TIRED OR HAVING LITTLE ENERGY: SEVERAL DAYS
SUM OF ALL RESPONSES TO PHQ QUESTIONS 1-9: 5
SUM OF ALL RESPONSES TO PHQ QUESTIONS 1-9: 5
9. THOUGHTS THAT YOU WOULD BE BETTER OFF DEAD, OR OF HURTING YOURSELF: NOT AT ALL
SUM OF ALL RESPONSES TO PHQ9 QUESTIONS 1 & 2: 2
3. TROUBLE FALLING OR STAYING ASLEEP: NOT AT ALL
6. FEELING BAD ABOUT YOURSELF - OR THAT YOU ARE A FAILURE OR HAVE LET YOURSELF OR YOUR FAMILY DOWN: NOT AT ALL
10. IF YOU CHECKED OFF ANY PROBLEMS, HOW DIFFICULT HAVE THESE PROBLEMS MADE IT FOR YOU TO DO YOUR WORK, TAKE CARE OF THINGS AT HOME, OR GET ALONG WITH OTHER PEOPLE: NOT DIFFICULT AT ALL
2. FEELING DOWN, DEPRESSED OR HOPELESS: SEVERAL DAYS
SUM OF ALL RESPONSES TO PHQ QUESTIONS 1-9: 5
8. MOVING OR SPEAKING SO SLOWLY THAT OTHER PEOPLE COULD HAVE NOTICED. OR THE OPPOSITE, BEING SO FIGETY OR RESTLESS THAT YOU HAVE BEEN MOVING AROUND A LOT MORE THAN USUAL: NOT AT ALL

## 2024-08-26 ASSESSMENT — LIFESTYLE VARIABLES
HOW OFTEN DO YOU HAVE A DRINK CONTAINING ALCOHOL: 1
HOW OFTEN DO YOU HAVE A DRINK CONTAINING ALCOHOL: NEVER
HOW MANY STANDARD DRINKS CONTAINING ALCOHOL DO YOU HAVE ON A TYPICAL DAY: 0
HOW MANY STANDARD DRINKS CONTAINING ALCOHOL DO YOU HAVE ON A TYPICAL DAY: PATIENT DOES NOT DRINK
HOW OFTEN DO YOU HAVE SIX OR MORE DRINKS ON ONE OCCASION: 1

## 2024-08-29 ENCOUNTER — OFFICE VISIT (OUTPATIENT)
Dept: FAMILY MEDICINE CLINIC | Facility: CLINIC | Age: 74
End: 2024-08-29

## 2024-08-29 VITALS
BODY MASS INDEX: 20.54 KG/M2 | OXYGEN SATURATION: 99 % | HEIGHT: 62 IN | DIASTOLIC BLOOD PRESSURE: 73 MMHG | HEART RATE: 73 BPM | WEIGHT: 111.6 LBS | SYSTOLIC BLOOD PRESSURE: 118 MMHG

## 2024-08-29 DIAGNOSIS — M81.0 AGE-RELATED OSTEOPOROSIS WITHOUT CURRENT PATHOLOGICAL FRACTURE: ICD-10-CM

## 2024-08-29 DIAGNOSIS — Z00.00 MEDICARE ANNUAL WELLNESS VISIT, SUBSEQUENT: Primary | ICD-10-CM

## 2024-08-29 DIAGNOSIS — F02.80 EARLY ONSET ALZHEIMER'S DEMENTIA WITHOUT BEHAVIORAL DISTURBANCE (HCC): ICD-10-CM

## 2024-08-29 DIAGNOSIS — E78.00 PURE HYPERCHOLESTEROLEMIA: ICD-10-CM

## 2024-08-29 DIAGNOSIS — Z86.39 HISTORY OF PRIMARY HYPERPARATHYROIDISM: ICD-10-CM

## 2024-08-29 DIAGNOSIS — G30.0 EARLY ONSET ALZHEIMER'S DEMENTIA WITHOUT BEHAVIORAL DISTURBANCE (HCC): ICD-10-CM

## 2024-08-29 DIAGNOSIS — M19.90 OSTEOARTHRITIS, UNSPECIFIED OSTEOARTHRITIS TYPE, UNSPECIFIED SITE: ICD-10-CM

## 2024-08-29 DIAGNOSIS — E55.9 VITAMIN D DEFICIENCY: ICD-10-CM

## 2024-08-29 DIAGNOSIS — F33.0 MAJOR DEPRESSIVE DISORDER, RECURRENT, MILD (HCC): ICD-10-CM

## 2024-08-29 LAB
BASOPHILS # BLD: 0.1 K/UL (ref 0–0.2)
BASOPHILS NFR BLD: 1 % (ref 0–2)
DIFFERENTIAL METHOD BLD: ABNORMAL
EOSINOPHIL # BLD: 0.1 K/UL (ref 0–0.8)
EOSINOPHIL NFR BLD: 1 % (ref 0.5–7.8)
ERYTHROCYTE [DISTWIDTH] IN BLOOD BY AUTOMATED COUNT: 13.3 % (ref 11.9–14.6)
HCT VFR BLD AUTO: 42.3 % (ref 35.8–46.3)
HGB BLD-MCNC: 12.9 G/DL (ref 11.7–15.4)
IMM GRANULOCYTES # BLD AUTO: 0 K/UL (ref 0–0.5)
IMM GRANULOCYTES NFR BLD AUTO: 0 % (ref 0–5)
LYMPHOCYTES # BLD: 1.7 K/UL (ref 0.5–4.6)
LYMPHOCYTES NFR BLD: 20 % (ref 13–44)
MCH RBC QN AUTO: 31.3 PG (ref 26.1–32.9)
MCHC RBC AUTO-ENTMCNC: 30.5 G/DL (ref 31.4–35)
MCV RBC AUTO: 102.7 FL (ref 82–102)
MONOCYTES # BLD: 0.6 K/UL (ref 0.1–1.3)
MONOCYTES NFR BLD: 7 % (ref 4–12)
NEUTS SEG # BLD: 6.1 K/UL (ref 1.7–8.2)
NEUTS SEG NFR BLD: 71 % (ref 43–78)
NRBC # BLD: 0 K/UL (ref 0–0.2)
PLATELET # BLD AUTO: 243 K/UL (ref 150–450)
PMV BLD AUTO: 9.1 FL (ref 9.4–12.3)
RBC # BLD AUTO: 4.12 M/UL (ref 4.05–5.2)
WBC # BLD AUTO: 8.6 K/UL (ref 4.3–11.1)

## 2024-08-29 NOTE — PROGRESS NOTES
\"Have you been to the ER, urgent care clinic since your last visit?  Hospitalized since your last visit?\"    NO    “Have you seen or consulted any other health care providers outside of [facility] since your last visit?”    NO    Have you had a mammogram?”   NO    Date of last Mammogram: 6/11/2018         “Have you had a colorectal cancer screening such as a colonoscopy/FIT/Cologuard?    NO    Date of last Colonoscopy: 7/15/2013  No cologuard on file  No FIT/FOBT on file   No flexible sigmoidoscopy on file         Click Here for Release of Records Request

## 2024-08-29 NOTE — PROGRESS NOTES
Medicare Annual Wellness Visit    Jae Loepz is here for Medicare AWV (Subs )    Assessment & Plan   Medicare annual wellness visit, subsequent  Recommendations for Preventive Services Due: see orders and patient instructions/AVS.  Recommended screening schedule for the next 5-10 years is provided to the patient in written form: see Patient Instructions/AVS.     No follow-ups on file.     Subjective    who has a past medical history significant for Alzheimer's dementia, hyperparathyroidism status post parathyroidectomy, age-related osteoporosis, recurrent depression, high cholesterol, DJD, vitamin D deficiency    Patient's complete Health Risk Assessment and screening values have been reviewed and are found in Flowsheets. The following problems were reviewed today and where indicated follow up appointments were made and/or referrals ordered.    Positive Risk Factor Screenings with Interventions:    Fall Risk:  Do you feel unsteady or are you worried about falling? : (!) yes  2 or more falls in past year?: no  Fall with injury in past year?: no     Interventions:    Reviewed medications, home hazards, visual acuity, and co-morbidities that can increase risk for falls     Depression:  PHQ-2 Score: 2  PHQ-9 Total Score: 5  Total Score Interpretation: 5-9 = mild depression  Interventions:  Caregiver reports depression maintained and well-controlled                Vision Screen:  Do you have difficulty driving, watching TV, or doing any of your daily activities because of your eyesight?: No  Have you had an eye exam within the past year?: (!) No  Interventions:   Patient encouraged to make appointment with their eye specialist     ADL's:   Patient reports needing help with:  Select all that apply: (!) Dressing, Grooming, Bathing  Select all that apply: (!) Laundry, Housekeeping, Banking/Finances, Food Preparation, Transportation, Taking Medications  Interventions:  Patient has assistance from family    Advanced  Directives:  Do you have a Living Will?: (!) No    Intervention:  has NO advanced directive - information provided                     Objective   Vitals:    08/29/24 1056   BP: 118/73   Site: Left Upper Arm   Position: Sitting   Cuff Size: Medium Adult   Pulse: 73   SpO2: 99%   Weight: 50.6 kg (111 lb 9.6 oz)   Height: 1.575 m (5' 2\")      Body mass index is 20.41 kg/m².        Skin: warm and dry, no rash or erythema  Head: normocephalic and atraumatic  Eyes: pupils equal, round, and reactive to light, extraocular eye movements intact, conjunctivae normal  ENT: tympanic membrane, external ear and ear canal normal bilaterally, nose without deformity, nasal mucosa and turbinates normal without polyps  Neck: supple and non-tender without mass, no thyromegaly or thyroid nodules, no cervical lymphadenopathy  Pulmonary/Chest: clear to auscultation bilaterally- no wheezes, rales or rhonchi, normal air movement, no respiratory distress  Cardiovascular: normal rate, regular rhythm, normal S1 and S2, no murmurs, rubs, clicks, or gallops, distal pulses intact, no carotid bruits  Abdomen: soft, non-tender, non-distended, normal bowel sounds, no masses or organomegaly  Extremities: no cyanosis, clubbing or edema  Musculoskeletal: normal range of motion, no joint swelling, deformity or tenderness  Neurologic: reflexes normal and symmetric, no cranial nerve deficit, gait, coordination and speech normal          No Known Allergies  Prior to Visit Medications    Medication Sig Taking? Authorizing Provider   Calcium Carbonate-Vit D-Min (CALTRATE 600+D PLUS PO) Take 1 tablet by mouth daily Yes Danny Posada MD   romosozumab-aqqg (EVENITY) 105 MG/1.17ML SOSY injection Inject 2.34 mLs into the skin once Yes Danny Posada MD   Cholecalciferol (GNP VITAMIN D MAXIMUM STRENGTH) 50 MCG (2000 UT) TABS Take 1 tablet by mouth 2 times daily Yes Mello Luong MD   donepezil (ARICEPT) 10 MG tablet Take 1 tablet by mouth nightly

## 2024-08-29 NOTE — PROGRESS NOTES
SUBJECTIVE:   Jae Lopez is a 74 y.o. female who has a past medical history significant for Alzheimer's dementia, hyperparathyroidism status post parathyroidectomy, age-related osteoporosis, recurrent depression, high cholesterol, DJD, vitamin D deficiency.  Patient is accompanied by her .  Overall he states that she is doing well and is stable.  Her current medicines are listed in the EMR and reviewed today.  No breakthrough depressive symptoms are reported.  Patient has had a left inferior parathyroidectomy since I last seen her.  She is also being managed by rheumatology for her osteoporosis.    HPI  See above    Past Medical History, Past Surgical History, Family history, Social History, and Medications were all reviewed with the patient today and updated as necessary.       Current Outpatient Medications   Medication Sig Dispense Refill    Calcium Carbonate-Vit D-Min (CALTRATE 600+D PLUS PO) Take 1 tablet by mouth daily      romosozumab-aqqg (EVENITY) 105 MG/1.17ML SOSY injection Inject 2.34 mLs into the skin once      Cholecalciferol (GNP VITAMIN D MAXIMUM STRENGTH) 50 MCG (2000 UT) TABS Take 1 tablet by mouth 2 times daily 60 tablet 5    donepezil (ARICEPT) 10 MG tablet Take 1 tablet by mouth nightly 90 tablet 2    DULoxetine (CYMBALTA) 30 MG extended release capsule TAKE ONE CAPSULE BY MOUTH ONE TIME DAILY 30 capsule 3    meloxicam (MOBIC) 7.5 MG tablet Take 1 tablet by mouth daily 30 tablet 3    memantine (NAMENDA) 10 MG tablet Take 1 tablet by mouth 2 times daily 180 tablet 2     No current facility-administered medications for this visit.     No Known Allergies  Patient Active Problem List   Diagnosis    Early onset Alzheimer's dementia without behavioral disturbance (HCC)    Encounter for medication management    H/O seasonal allergies    Rash and nonspecific skin eruption    Vitamin D deficiency    Major depressive disorder, recurrent, mild    History of primary hyperparathyroidism     note have been dictated using speech recognition software. As a result, errors of speech recognition may have occurred.

## 2024-08-29 NOTE — PATIENT INSTRUCTIONS
professional. [employer] disclaims any warranty or liability for your use of this information.           Advance Directives: Care Instructions  Overview  An advance directive is a legal way to state your wishes at the end of your life. It tells your family and your doctor what to do if you can't say what you want.  There are two main types of advance directives. You can change them any time your wishes change.  Living will.  This form tells your family and your doctor your wishes about life support and other treatment. The form is also called a declaration.  Medical power of .  This form lets you name a person to make treatment decisions for you when you can't speak for yourself. This person is called a health care agent (health care proxy, health care surrogate). The form is also called a durable power of  for health care.  If you do not have an advance directive, decisions about your medical care may be made by a family member, or by a doctor or a  who doesn't know you.  It may help to think of an advance directive as a gift to the people who care for you. If you have one, they won't have to make tough decisions by themselves.  For more information, including forms for your state, see the [employer] website (www.caringinfo.org/planning/advance-directives/).  Follow-up care is a key part of your treatment and safety. Be sure to make and go to all appointments, and call your doctor if you are having problems. It's also a good idea to know your test results and keep a list of the medicines you take.  What should you include in an advance directive?  Many states have a unique advance directive form. (It may ask you to address specific issues.) Or you might use a universal form that's approved by many states.  If your form doesn't tell you what to address, it may be hard to know what to include in your advance directive. Use the questions below to help you get started.  Who do you  vaping. If you need help quitting, talk to your doctor about stop-smoking programs and medicines. These can increase your chances of quitting for good. Quitting is one of the most important things you can do to protect your heart. It is never too late to quit. Try to avoid secondhand smoke too.     Stay at a weight that's healthy for you. Talk to your doctor if you need help losing weight.     Try to get 7 to 9 hours of sleep each night.     Limit alcohol to 2 drinks a day for men and 1 drink a day for women. Too much alcohol can cause health problems.     Manage other health problems such as diabetes, high blood pressure, and high cholesterol. If you think you may have a problem with alcohol or drug use, talk to your doctor.   Medicines    Take your medicines exactly as prescribed. Call your doctor if you think you are having a problem with your medicine.     If your doctor recommends aspirin, take the amount directed each day. Make sure you take aspirin and not another kind of pain reliever, such as acetaminophen (Tylenol).   When should you call for help?   Call 911 if you have symptoms of a heart attack. These may include:    Chest pain or pressure, or a strange feeling in the chest.     Sweating.     Shortness of breath.     Pain, pressure, or a strange feeling in the back, neck, jaw, or upper belly or in one or both shoulders or arms.     Lightheadedness or sudden weakness.     A fast or irregular heartbeat.   After you call 911, the  may tell you to chew 1 adult-strength or 2 to 4 low-dose aspirin. Wait for an ambulance. Do not try to drive yourself.  Watch closely for changes in your health, and be sure to contact your doctor if you have any problems.  Where can you learn more?  Go to https://www.[employer].net/patientEd and enter F075 to learn more about \"A Healthy Heart: Care Instructions.\"  Current as of: June 24, 2023  Content Version: 14.1  © 4675-4127 [employer].   Care

## 2024-08-30 LAB
25(OH)D3 SERPL-MCNC: 68.3 NG/ML (ref 30–100)
ALBUMIN SERPL-MCNC: 3.8 G/DL (ref 3.2–4.6)
ALBUMIN/GLOB SERPL: 1.3 (ref 1–1.9)
ALP SERPL-CCNC: 130 U/L (ref 35–104)
ALT SERPL-CCNC: 21 U/L (ref 12–65)
ANION GAP SERPL CALC-SCNC: 7 MMOL/L (ref 9–18)
AST SERPL-CCNC: 30 U/L (ref 15–37)
BILIRUB SERPL-MCNC: 0.3 MG/DL (ref 0–1.2)
BUN SERPL-MCNC: 22 MG/DL (ref 8–23)
CALCIUM SERPL-MCNC: 9.6 MG/DL (ref 8.8–10.2)
CHLORIDE SERPL-SCNC: 104 MMOL/L (ref 98–107)
CHOLEST SERPL-MCNC: 213 MG/DL (ref 0–200)
CO2 SERPL-SCNC: 31 MMOL/L (ref 20–28)
CREAT SERPL-MCNC: 0.62 MG/DL (ref 0.6–1.1)
GLOBULIN SER CALC-MCNC: 2.8 G/DL (ref 2.3–3.5)
GLUCOSE SERPL-MCNC: 130 MG/DL (ref 70–99)
HDLC SERPL-MCNC: 57 MG/DL (ref 40–60)
HDLC SERPL: 3.7 (ref 0–5)
LDLC SERPL CALC-MCNC: 128 MG/DL (ref 0–100)
POTASSIUM SERPL-SCNC: 4.5 MMOL/L (ref 3.5–5.1)
PROT SERPL-MCNC: 6.7 G/DL (ref 6.3–8.2)
SODIUM SERPL-SCNC: 142 MMOL/L (ref 136–145)
TRIGL SERPL-MCNC: 141 MG/DL (ref 0–150)
TSH, 3RD GENERATION: 1.2 UIU/ML (ref 0.27–4.2)
VLDLC SERPL CALC-MCNC: 28 MG/DL (ref 6–23)

## 2024-09-09 ENCOUNTER — NURSE ONLY (OUTPATIENT)
Dept: RHEUMATOLOGY | Age: 74
End: 2024-09-09
Payer: MEDICARE

## 2024-09-09 VITALS — DIASTOLIC BLOOD PRESSURE: 72 MMHG | SYSTOLIC BLOOD PRESSURE: 109 MMHG | TEMPERATURE: 97.3 F | HEART RATE: 74 BPM

## 2024-09-09 DIAGNOSIS — M81.0 AGE-RELATED OSTEOPOROSIS WITHOUT CURRENT PATHOLOGICAL FRACTURE: Primary | ICD-10-CM

## 2024-09-09 PROCEDURE — 96372 THER/PROPH/DIAG INJ SC/IM: CPT | Performed by: INTERNAL MEDICINE

## 2024-09-09 RX ORDER — ACETAMINOPHEN 325 MG/1
650 TABLET ORAL
OUTPATIENT
Start: 2024-09-27

## 2024-09-09 RX ORDER — SODIUM CHLORIDE 9 MG/ML
INJECTION, SOLUTION INTRAVENOUS CONTINUOUS
OUTPATIENT
Start: 2024-09-27

## 2024-09-09 RX ORDER — ACETAMINOPHEN 325 MG/1
650 TABLET ORAL
Status: DISCONTINUED | OUTPATIENT
Start: 2024-09-09 | End: 2024-09-09 | Stop reason: HOSPADM

## 2024-09-09 RX ORDER — EPINEPHRINE 1 MG/ML
0.3 INJECTION, SOLUTION, CONCENTRATE INTRAVENOUS PRN
OUTPATIENT
Start: 2024-09-27

## 2024-09-09 RX ORDER — ONDANSETRON 2 MG/ML
8 INJECTION INTRAMUSCULAR; INTRAVENOUS
OUTPATIENT
Start: 2024-09-27

## 2024-09-09 RX ORDER — SODIUM CHLORIDE 9 MG/ML
INJECTION, SOLUTION INTRAVENOUS CONTINUOUS
Status: DISCONTINUED | OUTPATIENT
Start: 2024-09-09 | End: 2024-09-09 | Stop reason: HOSPADM

## 2024-09-09 RX ORDER — DIPHENHYDRAMINE HYDROCHLORIDE 50 MG/ML
50 INJECTION INTRAMUSCULAR; INTRAVENOUS
OUTPATIENT
Start: 2024-09-27

## 2024-09-09 RX ORDER — DIPHENHYDRAMINE HYDROCHLORIDE 50 MG/ML
50 INJECTION INTRAMUSCULAR; INTRAVENOUS
Status: DISCONTINUED | OUTPATIENT
Start: 2024-09-09 | End: 2024-09-09 | Stop reason: HOSPADM

## 2024-09-09 RX ORDER — ONDANSETRON 2 MG/ML
8 INJECTION INTRAMUSCULAR; INTRAVENOUS
Status: DISCONTINUED | OUTPATIENT
Start: 2024-09-09 | End: 2024-09-09 | Stop reason: HOSPADM

## 2024-09-09 RX ORDER — ALBUTEROL SULFATE 90 UG/1
4 AEROSOL, METERED RESPIRATORY (INHALATION) PRN
OUTPATIENT
Start: 2024-09-27

## 2024-09-09 RX ORDER — ALBUTEROL SULFATE 90 UG/1
4 AEROSOL, METERED RESPIRATORY (INHALATION) PRN
Status: DISCONTINUED | OUTPATIENT
Start: 2024-09-09 | End: 2024-09-09 | Stop reason: HOSPADM

## 2024-09-09 RX ORDER — EPINEPHRINE 1 MG/ML
0.3 INJECTION, SOLUTION, CONCENTRATE INTRAVENOUS PRN
Status: DISCONTINUED | OUTPATIENT
Start: 2024-09-09 | End: 2024-09-09 | Stop reason: HOSPADM

## 2024-09-16 RX ORDER — MELOXICAM 7.5 MG/1
7.5 TABLET ORAL DAILY
Qty: 30 TABLET | Refills: 3 | Status: SHIPPED | OUTPATIENT
Start: 2024-09-16

## 2024-09-25 RX ORDER — DULOXETIN HYDROCHLORIDE 30 MG/1
30 CAPSULE, DELAYED RELEASE ORAL DAILY
Qty: 30 CAPSULE | Refills: 5 | Status: SHIPPED | OUTPATIENT
Start: 2024-09-25

## 2024-10-03 ENCOUNTER — TELEMEDICINE (OUTPATIENT)
Dept: FAMILY MEDICINE CLINIC | Facility: CLINIC | Age: 74
End: 2024-10-03

## 2024-10-03 DIAGNOSIS — E78.00 PURE HYPERCHOLESTEROLEMIA: Primary | ICD-10-CM

## 2024-10-03 DIAGNOSIS — R73.9 ELEVATED BLOOD SUGAR: ICD-10-CM

## 2024-10-03 DIAGNOSIS — F33.9 RECURRENT DEPRESSION (HCC): ICD-10-CM

## 2024-10-03 DIAGNOSIS — M81.0 AGE-RELATED OSTEOPOROSIS WITHOUT CURRENT PATHOLOGICAL FRACTURE: ICD-10-CM

## 2024-10-03 DIAGNOSIS — E55.9 VITAMIN D DEFICIENCY: ICD-10-CM

## 2024-10-03 DIAGNOSIS — F02.80 EARLY ONSET ALZHEIMER'S DEMENTIA WITHOUT BEHAVIORAL DISTURBANCE (HCC): ICD-10-CM

## 2024-10-03 DIAGNOSIS — Z86.39 HISTORY OF PRIMARY HYPERPARATHYROIDISM: ICD-10-CM

## 2024-10-03 DIAGNOSIS — G30.0 EARLY ONSET ALZHEIMER'S DEMENTIA WITHOUT BEHAVIORAL DISTURBANCE (HCC): ICD-10-CM

## 2024-10-03 SDOH — ECONOMIC STABILITY: FOOD INSECURITY: WITHIN THE PAST 12 MONTHS, THE FOOD YOU BOUGHT JUST DIDN'T LAST AND YOU DIDN'T HAVE MONEY TO GET MORE.: NEVER TRUE

## 2024-10-03 SDOH — ECONOMIC STABILITY: FOOD INSECURITY: WITHIN THE PAST 12 MONTHS, YOU WORRIED THAT YOUR FOOD WOULD RUN OUT BEFORE YOU GOT MONEY TO BUY MORE.: NEVER TRUE

## 2024-10-03 SDOH — ECONOMIC STABILITY: INCOME INSECURITY: HOW HARD IS IT FOR YOU TO PAY FOR THE VERY BASICS LIKE FOOD, HOUSING, MEDICAL CARE, AND HEATING?: NOT HARD AT ALL

## 2024-10-03 ASSESSMENT — PATIENT HEALTH QUESTIONNAIRE - PHQ9
2. FEELING DOWN, DEPRESSED OR HOPELESS: NOT AT ALL
1. LITTLE INTEREST OR PLEASURE IN DOING THINGS: NOT AT ALL
SUM OF ALL RESPONSES TO PHQ QUESTIONS 1-9: 0
SUM OF ALL RESPONSES TO PHQ QUESTIONS 1-9: 0
SUM OF ALL RESPONSES TO PHQ9 QUESTIONS 1 & 2: 0
SUM OF ALL RESPONSES TO PHQ QUESTIONS 1-9: 0
SUM OF ALL RESPONSES TO PHQ QUESTIONS 1-9: 0

## 2024-10-03 NOTE — PROGRESS NOTES
\"Have you been to the ER, urgent care clinic since your last visit?  Hospitalized since your last visit?\"    NO    “Have you seen or consulted any other health care providers outside our system since your last visit?”    NO    Have you had a mammogram?”   NO    Date of last Mammogram: 6/11/2018       “Have you had a colorectal cancer screening such as a colonoscopy/FIT/Cologuard?    NO    Date of last Colonoscopy: 7/15/2013  No cologuard on file  No FIT/FOBT on file   No flexible sigmoidoscopy on file           
Patient identification was verified at the start of the visit. She (or guardian if applicable) is aware that this is a billable service, which includes applicable co-pays. This visit was conducted with the patient's (and/or legal guardian's) verbal consent. She has not had a related appointment within my department in the past 7 days or scheduled within the next 24 hours.   The patient was located at Home: [address] [place] [id number].  The provider was located at Facility (Appt Dept): [address] [place] [address] [place] [address].    Note: not billable if this call serves to triage the patient into an appointment for the relevant concern    On this date 10/3/2024 I have spent 17 minutes reviewing previous notes, test results and face to face with the patient discussing the diagnosis and importance of compliance with the treatment plan as well as documenting on the day of the visit. Greater than 50% of this visit was spent counseling the patient about test results, prognosis, importance of compliance, education about disease process, benefits of medications, instructions for management of acute symptoms, and follow up plans.    --EYAD EPPS MD on 10/3/2024 at 12:59 PM    An electronic signature was used to authenticate this note.

## 2024-10-07 ENCOUNTER — NURSE ONLY (OUTPATIENT)
Dept: RHEUMATOLOGY | Age: 74
End: 2024-10-07
Payer: MEDICARE

## 2024-10-07 VITALS — DIASTOLIC BLOOD PRESSURE: 73 MMHG | TEMPERATURE: 97.2 F | HEART RATE: 65 BPM | SYSTOLIC BLOOD PRESSURE: 122 MMHG

## 2024-10-07 DIAGNOSIS — M81.0 AGE-RELATED OSTEOPOROSIS WITHOUT CURRENT PATHOLOGICAL FRACTURE: Primary | ICD-10-CM

## 2024-10-07 PROCEDURE — 96372 THER/PROPH/DIAG INJ SC/IM: CPT | Performed by: INTERNAL MEDICINE

## 2024-10-07 RX ORDER — DIPHENHYDRAMINE HYDROCHLORIDE 50 MG/ML
50 INJECTION INTRAMUSCULAR; INTRAVENOUS
Status: CANCELLED | OUTPATIENT
Start: 2024-10-09

## 2024-10-07 RX ORDER — ALBUTEROL SULFATE 90 UG/1
4 INHALANT RESPIRATORY (INHALATION) PRN
Status: CANCELLED | OUTPATIENT
Start: 2024-10-09

## 2024-10-07 RX ORDER — EPINEPHRINE 1 MG/ML
0.3 INJECTION, SOLUTION, CONCENTRATE INTRAVENOUS PRN
Status: CANCELLED | OUTPATIENT
Start: 2024-10-09

## 2024-10-07 RX ORDER — ONDANSETRON 2 MG/ML
8 INJECTION INTRAMUSCULAR; INTRAVENOUS
Status: CANCELLED | OUTPATIENT
Start: 2024-10-09

## 2024-10-07 RX ORDER — SODIUM CHLORIDE 9 MG/ML
INJECTION, SOLUTION INTRAVENOUS CONTINUOUS
Status: CANCELLED | OUTPATIENT
Start: 2024-10-09

## 2024-10-07 RX ORDER — ACETAMINOPHEN 325 MG/1
650 TABLET ORAL
Status: CANCELLED | OUTPATIENT
Start: 2024-10-09

## 2024-10-07 NOTE — PROGRESS NOTES
[facility] RHEUMATOLOGY  [address] [place] [address], [address]  [place] [address]  Phone: [phone], Fax: [phone]       Patient here for Evenity #12. Two separate syringes containing 105 mg/1.17 ml were  injected SC today into the patient's left and right upper abdomen quadrants. Patient tolerated the injections well. Advised patient to continue with calcium and vitamin D post injection. Patient instructed to call with any problems post injection. Patient's first prolia injection scheduled for 11/7/2024    Medication ordered by Dr. Martinez.     BP: 122/73    Pre-infusion/injection questionairre for osteoporosis    1. Have you had or are you planning any dental work?  No    2. Are you taking your calcium and vitamin D? Yes    3. When was your last osteoporosis treatment? 9/9/2024    4. Have you had any recent fractures? No    5. Are you currently in skilled nursing or inpatient rehab? No

## 2024-10-08 ENCOUNTER — TELEPHONE (OUTPATIENT)
Dept: RHEUMATOLOGY | Age: 74
End: 2024-10-08

## 2024-10-08 NOTE — TELEPHONE ENCOUNTER
----- Message from MARSHALL HERNDON MA sent at 8/8/2024  1:44 PM EDT -----  Regarding: prolia after Evenity  David Martinez MD [place], Yamel ROY MA  She is getting her 10th Evenity today and therefore has 2 more monthly injections to go.  I would like to get her started on denosumab roughly 4 weeks after her last Evenity.

## 2024-10-08 NOTE — TELEPHONE ENCOUNTER
Evenity #12 given 10/7/24. Prolia #1 scheduled for 11/7/24. Will be covered 100%.   Therapy plan entered.

## 2024-10-10 RX ORDER — ALBUTEROL SULFATE 90 UG/1
4 INHALANT RESPIRATORY (INHALATION) PRN
OUTPATIENT
Start: 2024-11-07

## 2024-10-10 RX ORDER — EPINEPHRINE 1 MG/ML
0.3 INJECTION, SOLUTION, CONCENTRATE INTRAVENOUS PRN
OUTPATIENT
Start: 2024-11-07

## 2024-10-10 RX ORDER — ACETAMINOPHEN 325 MG/1
650 TABLET ORAL
OUTPATIENT
Start: 2024-11-07

## 2024-10-10 RX ORDER — SODIUM CHLORIDE 9 MG/ML
INJECTION, SOLUTION INTRAVENOUS CONTINUOUS
OUTPATIENT
Start: 2024-11-07

## 2024-10-10 RX ORDER — DIPHENHYDRAMINE HYDROCHLORIDE 50 MG/ML
50 INJECTION INTRAMUSCULAR; INTRAVENOUS
OUTPATIENT
Start: 2024-11-07

## 2024-10-10 RX ORDER — ONDANSETRON 2 MG/ML
8 INJECTION INTRAMUSCULAR; INTRAVENOUS
OUTPATIENT
Start: 2024-11-07

## 2024-10-10 RX ORDER — FAMOTIDINE 10 MG/ML
20 INJECTION, SOLUTION INTRAVENOUS
OUTPATIENT
Start: 2024-11-07

## 2024-11-07 ENCOUNTER — NURSE ONLY (OUTPATIENT)
Dept: RHEUMATOLOGY | Age: 74
End: 2024-11-07
Payer: MEDICARE

## 2024-11-07 VITALS — HEART RATE: 65 BPM | DIASTOLIC BLOOD PRESSURE: 63 MMHG | TEMPERATURE: 98.2 F | SYSTOLIC BLOOD PRESSURE: 115 MMHG

## 2024-11-07 DIAGNOSIS — M81.0 AGE-RELATED OSTEOPOROSIS WITHOUT CURRENT PATHOLOGICAL FRACTURE: Primary | ICD-10-CM

## 2024-11-07 PROCEDURE — 96372 THER/PROPH/DIAG INJ SC/IM: CPT | Performed by: INTERNAL MEDICINE

## 2024-11-07 RX ORDER — EPINEPHRINE 1 MG/ML
0.3 INJECTION, SOLUTION, CONCENTRATE INTRAVENOUS PRN
OUTPATIENT
Start: 2025-05-08

## 2024-11-07 RX ORDER — ONDANSETRON 2 MG/ML
8 INJECTION INTRAMUSCULAR; INTRAVENOUS
OUTPATIENT
Start: 2025-05-08

## 2024-11-07 RX ORDER — SODIUM CHLORIDE 9 MG/ML
INJECTION, SOLUTION INTRAVENOUS CONTINUOUS
Status: DISCONTINUED | OUTPATIENT
Start: 2024-11-07 | End: 2024-11-07 | Stop reason: HOSPADM

## 2024-11-07 RX ORDER — EPINEPHRINE 1 MG/ML
0.3 INJECTION, SOLUTION, CONCENTRATE INTRAVENOUS PRN
Status: DISCONTINUED | OUTPATIENT
Start: 2024-11-07 | End: 2024-11-07 | Stop reason: HOSPADM

## 2024-11-07 RX ORDER — SODIUM CHLORIDE 9 MG/ML
INJECTION, SOLUTION INTRAVENOUS CONTINUOUS
OUTPATIENT
Start: 2025-05-08

## 2024-11-07 RX ORDER — DIPHENHYDRAMINE HYDROCHLORIDE 50 MG/ML
50 INJECTION INTRAMUSCULAR; INTRAVENOUS
Status: DISCONTINUED | OUTPATIENT
Start: 2024-11-07 | End: 2024-11-07 | Stop reason: HOSPADM

## 2024-11-07 RX ORDER — ALBUTEROL SULFATE 90 UG/1
4 INHALANT RESPIRATORY (INHALATION) PRN
OUTPATIENT
Start: 2025-05-08

## 2024-11-07 RX ORDER — DIPHENHYDRAMINE HYDROCHLORIDE 50 MG/ML
50 INJECTION INTRAMUSCULAR; INTRAVENOUS
OUTPATIENT
Start: 2025-05-08

## 2024-11-07 RX ORDER — ACETAMINOPHEN 325 MG/1
650 TABLET ORAL
OUTPATIENT
Start: 2025-05-08

## 2024-11-07 RX ORDER — ACETAMINOPHEN 325 MG/1
650 TABLET ORAL
Status: DISCONTINUED | OUTPATIENT
Start: 2024-11-07 | End: 2024-11-07 | Stop reason: HOSPADM

## 2024-11-07 RX ORDER — ALBUTEROL SULFATE 90 UG/1
4 INHALANT RESPIRATORY (INHALATION) PRN
Status: DISCONTINUED | OUTPATIENT
Start: 2024-11-07 | End: 2024-11-07 | Stop reason: HOSPADM

## 2024-11-07 RX ORDER — ONDANSETRON 2 MG/ML
8 INJECTION INTRAMUSCULAR; INTRAVENOUS
Status: DISCONTINUED | OUTPATIENT
Start: 2024-11-07 | End: 2024-11-07 | Stop reason: HOSPADM

## 2024-11-07 NOTE — PROGRESS NOTES
[facility] RHEUMATOLOGY  [address] [place] [address], [address]  [place] [address]  Office : [phone], Fax: [phone]       #1 Prolia 60mg/ml injected SC today into lower left abdominal quadrant. Patient tolerated injection well. Advised patient to continue with calcium and vitamin D post injection. Advised patient to call with any problems post injection. Patient monitored for approximately 15 minutes post injection with no reported symptoms or side effects.   Medication ordered by Dr. Martinez    Pre-infusion/injection questionairre for osteoporosis    1. Have you had or are you planning any dental work? No    2. Are you taking your calcium and vitamin D? Yes    3. When was your last osteoporosis treatment? First Prolia injection today (last Evenity was 10/7/2024)    4. Have you had any recent fractures? No    5. Are you currently in skilled nursing or in patient rehab? No

## 2024-11-25 DIAGNOSIS — G30.0 EARLY ONSET ALZHEIMER'S DEMENTIA WITHOUT BEHAVIORAL DISTURBANCE (HCC): ICD-10-CM

## 2024-11-25 DIAGNOSIS — F02.80 EARLY ONSET ALZHEIMER'S DEMENTIA WITHOUT BEHAVIORAL DISTURBANCE (HCC): ICD-10-CM

## 2024-11-25 RX ORDER — MEMANTINE HYDROCHLORIDE 10 MG/1
10 TABLET ORAL 2 TIMES DAILY
Qty: 180 TABLET | Refills: 2 | OUTPATIENT
Start: 2024-11-25

## 2025-01-27 DIAGNOSIS — E55.9 VITAMIN D DEFICIENCY: ICD-10-CM

## 2025-01-27 RX ORDER — CHOLECALCIFEROL (VITAMIN D3) 50 MCG
2000 TABLET ORAL 2 TIMES DAILY
Qty: 60 TABLET | Refills: 5 | OUTPATIENT
Start: 2025-01-27

## 2025-01-27 RX ORDER — DM/PE/ACETAMINOPHEN/CHLORPHENR 10-5-325-2
1 TABLET, SEQUENTIAL ORAL 2 TIMES DAILY
Qty: 60 TABLET | Refills: 5 | Status: SHIPPED | OUTPATIENT
Start: 2025-01-27

## 2025-02-21 DIAGNOSIS — F02.80 EARLY ONSET ALZHEIMER'S DEMENTIA WITHOUT BEHAVIORAL DISTURBANCE (HCC): ICD-10-CM

## 2025-02-21 DIAGNOSIS — G30.0 EARLY ONSET ALZHEIMER'S DEMENTIA WITHOUT BEHAVIORAL DISTURBANCE (HCC): ICD-10-CM

## 2025-02-21 DIAGNOSIS — R41.3 MEMORY LOSS OR IMPAIRMENT: ICD-10-CM

## 2025-02-21 RX ORDER — DONEPEZIL HYDROCHLORIDE 10 MG/1
10 TABLET, FILM COATED ORAL NIGHTLY
Qty: 90 TABLET | Refills: 2 | OUTPATIENT
Start: 2025-02-21

## 2025-02-22 DIAGNOSIS — F02.80 EARLY ONSET ALZHEIMER'S DEMENTIA WITHOUT BEHAVIORAL DISTURBANCE (HCC): ICD-10-CM

## 2025-02-22 DIAGNOSIS — R41.3 MEMORY LOSS OR IMPAIRMENT: ICD-10-CM

## 2025-02-22 DIAGNOSIS — G30.0 EARLY ONSET ALZHEIMER'S DEMENTIA WITHOUT BEHAVIORAL DISTURBANCE (HCC): ICD-10-CM

## 2025-02-24 RX ORDER — MELOXICAM 7.5 MG/1
7.5 TABLET ORAL DAILY
Qty: 30 TABLET | Refills: 3 | Status: SHIPPED | OUTPATIENT
Start: 2025-02-24

## 2025-02-24 RX ORDER — MEMANTINE HYDROCHLORIDE 10 MG/1
10 TABLET ORAL 2 TIMES DAILY
Qty: 180 TABLET | Refills: 2 | OUTPATIENT
Start: 2025-02-24

## 2025-02-24 NOTE — TELEPHONE ENCOUNTER
Ivan Prater sent this in previously, pending to you as she has a new to provider appt with you coming up on 03/11/2025. Thank you

## 2025-02-25 RX ORDER — MEMANTINE HYDROCHLORIDE 10 MG/1
10 TABLET ORAL 2 TIMES DAILY
Qty: 180 TABLET | Refills: 1 | Status: SHIPPED | OUTPATIENT
Start: 2025-02-25 | End: 2025-08-24

## 2025-02-25 RX ORDER — DONEPEZIL HYDROCHLORIDE 10 MG/1
10 TABLET, FILM COATED ORAL NIGHTLY
Qty: 90 TABLET | Refills: 2 | Status: SHIPPED | OUTPATIENT
Start: 2025-02-25

## 2025-03-11 ENCOUNTER — OFFICE VISIT (OUTPATIENT)
Dept: NEUROLOGY | Age: 75
End: 2025-03-11
Payer: MEDICARE

## 2025-03-11 VITALS
HEART RATE: 58 BPM | OXYGEN SATURATION: 99 % | DIASTOLIC BLOOD PRESSURE: 67 MMHG | WEIGHT: 115 LBS | BODY MASS INDEX: 21.03 KG/M2 | SYSTOLIC BLOOD PRESSURE: 115 MMHG

## 2025-03-11 DIAGNOSIS — G30.0 EARLY ONSET ALZHEIMER'S DEMENTIA WITHOUT BEHAVIORAL DISTURBANCE (HCC): Primary | ICD-10-CM

## 2025-03-11 DIAGNOSIS — F02.80 EARLY ONSET ALZHEIMER'S DEMENTIA WITHOUT BEHAVIORAL DISTURBANCE (HCC): Primary | ICD-10-CM

## 2025-03-11 DIAGNOSIS — F02.80 EARLY ONSET ALZHEIMER'S DEMENTIA WITHOUT BEHAVIORAL DISTURBANCE (HCC): ICD-10-CM

## 2025-03-11 DIAGNOSIS — G30.0 EARLY ONSET ALZHEIMER'S DEMENTIA WITHOUT BEHAVIORAL DISTURBANCE (HCC): ICD-10-CM

## 2025-03-11 PROCEDURE — G8427 DOCREV CUR MEDS BY ELIG CLIN: HCPCS | Performed by: PSYCHIATRY & NEUROLOGY

## 2025-03-11 PROCEDURE — 1160F RVW MEDS BY RX/DR IN RCRD: CPT | Performed by: PSYCHIATRY & NEUROLOGY

## 2025-03-11 PROCEDURE — G8420 CALC BMI NORM PARAMETERS: HCPCS | Performed by: PSYCHIATRY & NEUROLOGY

## 2025-03-11 PROCEDURE — 1123F ACP DISCUSS/DSCN MKR DOCD: CPT | Performed by: PSYCHIATRY & NEUROLOGY

## 2025-03-11 PROCEDURE — 3017F COLORECTAL CA SCREEN DOC REV: CPT | Performed by: PSYCHIATRY & NEUROLOGY

## 2025-03-11 PROCEDURE — G8399 PT W/DXA RESULTS DOCUMENT: HCPCS | Performed by: PSYCHIATRY & NEUROLOGY

## 2025-03-11 PROCEDURE — 1159F MED LIST DOCD IN RCRD: CPT | Performed by: PSYCHIATRY & NEUROLOGY

## 2025-03-11 PROCEDURE — 1036F TOBACCO NON-USER: CPT | Performed by: PSYCHIATRY & NEUROLOGY

## 2025-03-11 PROCEDURE — 99215 OFFICE O/P EST HI 40 MIN: CPT | Performed by: PSYCHIATRY & NEUROLOGY

## 2025-03-11 PROCEDURE — 1090F PRES/ABSN URINE INCON ASSESS: CPT | Performed by: PSYCHIATRY & NEUROLOGY

## 2025-03-11 NOTE — PROGRESS NOTES
Expenses: Not hard at all   Food Insecurity: No Food Insecurity (10/3/2024)    Hunger Vital Sign     Worried About Running Out of Food in the Last Year: Never true     Ran Out of Food in the Last Year: Never true   Transportation Needs: Unknown (10/3/2024)    PRAPARE - Transportation     Lack of Transportation (Non-Medical): No   Physical Activity: Unknown (8/26/2024)    Exercise Vital Sign     Days of Exercise per Week: Patient declined   Social Connections: Unknown (3/20/2021)    Received from [employer]    Social Connections     Frequency of Communication with Friends and Family: Not asked     Frequency of Social Gatherings with Friends and Family: Not asked   Intimate Partner Violence: Unknown (3/20/2021)    Received from [employer] [facility]    Intimate Partner Violence     Fear of Current or Ex-Partner: Not asked     Emotionally Abused: Not asked     Physically Abused: Not asked     Sexually Abused: Not asked   Housing Stability: Unknown (10/3/2024)    Housing Stability Vital Sign     Homeless in the Last Year: No         Medications/Allergies:   MEDICATIONS:   Outpatient Encounter Medications as of 3/11/2025   Medication Sig Dispense Refill    memantine (NAMENDA) 10 MG tablet Take 1 tablet by mouth 2 times daily 180 tablet 1    donepezil (ARICEPT) 10 MG tablet Take 1 tablet by mouth nightly 90 tablet 2    meloxicam (MOBIC) 7.5 MG tablet TAKE ONE TABLET BY MOUTH ONE TIME DAILY 30 tablet 3    Cholecalciferol (GNP VITAMIN D MAXIMUM STRENGTH) 50 MCG (2000 UT) TABS Take 1 tablet by mouth 2 times daily 60 tablet 5    DULoxetine (CYMBALTA) 30 MG extended release capsule TAKE ONE CAPSULE BY MOUTH ONE TIME DAILY 30 capsule 5    Calcium Carbonate-Vit D-Min (CALTRATE 600+D PLUS PO) Take 1 tablet by mouth daily      romosozumab-aqqg (EVENITY) 105 MG/1.17ML SOSY injection Inject 2.34 mLs into the skin once       No facility-administered encounter medications on file as of 3/11/2025.

## 2025-03-13 LAB
P-TAU181: 1.59 PG/ML (ref 0–0.97)
VIT B12 SERPL-MCNC: 567 PG/ML (ref 232–1245)

## 2025-03-14 LAB
METHYLMALONATE SERPL-SCNC: 251 NMOL/L (ref 0–378)
VIT B6 SERPL-MCNC: 4.6 UG/L (ref 3.4–65.2)

## 2025-03-18 ENCOUNTER — HOSPITAL ENCOUNTER (OUTPATIENT)
Dept: MRI IMAGING | Age: 75
Discharge: HOME | End: 2025-03-21
Attending: PSYCHIATRY & NEUROLOGY
Payer: MEDICARE

## 2025-03-18 PROCEDURE — 70551 MRI BRAIN STEM W/O DYE: CPT

## 2025-04-02 ENCOUNTER — TELEPHONE (OUTPATIENT)
Dept: NEUROLOGY | Age: 75
End: 2025-04-02

## 2025-04-02 NOTE — TELEPHONE ENCOUNTER
This encounter is to highlight MRI results.  The patient also has abnormal phosphorylated tau protein, highly suggestive of Alzheimer's dementia.  From our exam, she has a severe dementia.  Patient is already on all 3 medications to help her cognitive function.        There is evidence of extensive white matter disease somewhat consistent with a vascular dementia as well. There is ex-vacuo dilation of ventricles due to some brain atrophy.

## 2025-04-11 RX ORDER — DULOXETIN HYDROCHLORIDE 30 MG/1
30 CAPSULE, DELAYED RELEASE ORAL DAILY
Qty: 30 CAPSULE | Refills: 5 | Status: SHIPPED | OUTPATIENT
Start: 2025-04-11

## 2025-04-14 RX ORDER — DULOXETIN HYDROCHLORIDE 30 MG/1
30 CAPSULE, DELAYED RELEASE ORAL DAILY
Qty: 30 CAPSULE | Refills: 5 | OUTPATIENT
Start: 2025-04-14

## 2025-05-07 ENCOUNTER — TELEPHONE (OUTPATIENT)
Dept: RHEUMATOLOGY | Age: 75
End: 2025-05-07

## 2025-05-07 DIAGNOSIS — M81.0 AGE-RELATED OSTEOPOROSIS WITHOUT CURRENT PATHOLOGICAL FRACTURE: Primary | ICD-10-CM

## 2025-05-07 RX ORDER — EPINEPHRINE 1 MG/ML
0.3 INJECTION, SOLUTION, CONCENTRATE INTRAVENOUS PRN
OUTPATIENT
Start: 2025-05-07

## 2025-05-07 RX ORDER — ACETAMINOPHEN 325 MG/1
650 TABLET ORAL
OUTPATIENT
Start: 2025-05-07

## 2025-05-07 RX ORDER — ALBUTEROL SULFATE 90 UG/1
4 INHALANT RESPIRATORY (INHALATION) PRN
OUTPATIENT
Start: 2025-05-07

## 2025-05-07 RX ORDER — FAMOTIDINE 10 MG/ML
20 INJECTION, SOLUTION INTRAVENOUS
OUTPATIENT
Start: 2025-05-07

## 2025-05-07 RX ORDER — DIPHENHYDRAMINE HYDROCHLORIDE 50 MG/ML
50 INJECTION, SOLUTION INTRAMUSCULAR; INTRAVENOUS
OUTPATIENT
Start: 2025-05-07

## 2025-05-07 RX ORDER — ONDANSETRON 2 MG/ML
8 INJECTION INTRAMUSCULAR; INTRAVENOUS
OUTPATIENT
Start: 2025-05-07

## 2025-05-07 RX ORDER — HYDROCORTISONE SODIUM SUCCINATE 100 MG/2ML
100 INJECTION INTRAMUSCULAR; INTRAVENOUS
OUTPATIENT
Start: 2025-05-07

## 2025-05-07 RX ORDER — SODIUM CHLORIDE 9 MG/ML
INJECTION, SOLUTION INTRAVENOUS CONTINUOUS
OUTPATIENT
Start: 2025-05-07

## 2025-05-07 NOTE — TELEPHONE ENCOUNTER
OV and Prolia scheduled for 5/12/25. Labs are outdated. PHONE CALL to patient. Spoke with her . Advised she needs labs in order to receive her injection on Monday after her OV. She can go to [facility] tomorrow. BMP and vit d ordered.

## 2025-05-08 DIAGNOSIS — M81.0 AGE-RELATED OSTEOPOROSIS WITHOUT CURRENT PATHOLOGICAL FRACTURE: ICD-10-CM

## 2025-05-08 LAB
25(OH)D3 SERPL-MCNC: 64.5 NG/ML (ref 30–100)
ANION GAP SERPL CALC-SCNC: 11 MMOL/L (ref 7–16)
BUN SERPL-MCNC: 25 MG/DL (ref 8–23)
CALCIUM SERPL-MCNC: 9 MG/DL (ref 8.8–10.2)
CHLORIDE SERPL-SCNC: 107 MMOL/L (ref 98–107)
CO2 SERPL-SCNC: 27 MMOL/L (ref 20–29)
CREAT SERPL-MCNC: 0.6 MG/DL (ref 0.6–1.1)
GLUCOSE SERPL-MCNC: 83 MG/DL (ref 70–99)
POTASSIUM SERPL-SCNC: 3.7 MMOL/L (ref 3.5–5.1)
SODIUM SERPL-SCNC: 145 MMOL/L (ref 136–145)

## 2025-05-09 ASSESSMENT — RHEUMATOLOGY NEW PATIENT QUESTIONNAIRE
RASH: N
FAINTING: N
EASILY LOSING TEMPER: N
AGITATION: N
MEMORY LOSS: Y
DIFFICULTY STAYING ASLEEP: N
ANXIETY: N
INCREASED SUSCEPTIBILITY TO INFECTION: N
ANEMIA: N
SKIN REDNESS: N
SKIN TIGHTNESS: N
JOINT PAIN: Y
DIFFICULTY FALLING ASLEEP: N
SUN SENSITIVE (SUN ALLERGY): N
EXCESSIVE HAIR LOSS (MORE THAN YOUR NORM): N
MORNING STIFFNESS IN LOWER BACK: Y
MORNING STIFFNESS: Y
JOINT SWELLING: N
COLOR CHANGES OF HANDS OR FEET IN THE COLD: N
MUSCLE WEAKNESS: Y
NUMBNESS OR TINGLING IN HANDS OR FEET: N
HOW WOULD YOU DESCRIBE YOUR STIFFNESS ON AVERAGE: MILD
BEHAVIORAL CHANGES: N
DEPRESSION: N
UNUSUAL BLEEDING: N
HEADACHES: N
EASY BRUISING: N
NODULES/BUMPS: N
SWOLLEN OR TENDER GLANDS: N
LOSS OF CONSCIOUSNESS: N
SEIZURES: N

## 2025-05-12 ENCOUNTER — OFFICE VISIT (OUTPATIENT)
Dept: RHEUMATOLOGY | Age: 75
End: 2025-05-12
Payer: MEDICARE

## 2025-05-12 ENCOUNTER — CLINICAL SUPPORT (OUTPATIENT)
Dept: RHEUMATOLOGY | Age: 75
End: 2025-05-12
Payer: MEDICARE

## 2025-05-12 VITALS — SYSTOLIC BLOOD PRESSURE: 112 MMHG | DIASTOLIC BLOOD PRESSURE: 62 MMHG | HEART RATE: 72 BPM | TEMPERATURE: 97.8 F

## 2025-05-12 VITALS
WEIGHT: 114.2 LBS | SYSTOLIC BLOOD PRESSURE: 112 MMHG | OXYGEN SATURATION: 98 % | DIASTOLIC BLOOD PRESSURE: 62 MMHG | BODY MASS INDEX: 21.02 KG/M2 | HEIGHT: 62 IN | HEART RATE: 72 BPM

## 2025-05-12 DIAGNOSIS — M81.0 AGE-RELATED OSTEOPOROSIS WITHOUT CURRENT PATHOLOGICAL FRACTURE: Primary | ICD-10-CM

## 2025-05-12 DIAGNOSIS — E55.9 VITAMIN D DEFICIENCY: Chronic | ICD-10-CM

## 2025-05-12 DIAGNOSIS — R26.81 UNSTEADY GAIT: Chronic | ICD-10-CM

## 2025-05-12 DIAGNOSIS — M81.0 AGE-RELATED OSTEOPOROSIS WITHOUT CURRENT PATHOLOGICAL FRACTURE: Primary | Chronic | ICD-10-CM

## 2025-05-12 PROCEDURE — G8420 CALC BMI NORM PARAMETERS: HCPCS | Performed by: INTERNAL MEDICINE

## 2025-05-12 PROCEDURE — 1036F TOBACCO NON-USER: CPT | Performed by: INTERNAL MEDICINE

## 2025-05-12 PROCEDURE — 3017F COLORECTAL CA SCREEN DOC REV: CPT | Performed by: INTERNAL MEDICINE

## 2025-05-12 PROCEDURE — 1159F MED LIST DOCD IN RCRD: CPT | Performed by: INTERNAL MEDICINE

## 2025-05-12 PROCEDURE — G8427 DOCREV CUR MEDS BY ELIG CLIN: HCPCS | Performed by: INTERNAL MEDICINE

## 2025-05-12 PROCEDURE — 96372 THER/PROPH/DIAG INJ SC/IM: CPT | Performed by: INTERNAL MEDICINE

## 2025-05-12 PROCEDURE — 99214 OFFICE O/P EST MOD 30 MIN: CPT | Performed by: INTERNAL MEDICINE

## 2025-05-12 PROCEDURE — 1160F RVW MEDS BY RX/DR IN RCRD: CPT | Performed by: INTERNAL MEDICINE

## 2025-05-12 PROCEDURE — 1123F ACP DISCUSS/DSCN MKR DOCD: CPT | Performed by: INTERNAL MEDICINE

## 2025-05-12 RX ORDER — ALBUTEROL SULFATE 90 UG/1
4 INHALANT RESPIRATORY (INHALATION) PRN
OUTPATIENT
Start: 2025-11-03

## 2025-05-12 RX ORDER — SODIUM CHLORIDE 9 MG/ML
INJECTION, SOLUTION INTRAVENOUS CONTINUOUS
Status: DISCONTINUED | OUTPATIENT
Start: 2025-05-12 | End: 2025-05-12 | Stop reason: HOSPADM

## 2025-05-12 RX ORDER — FAMOTIDINE 10 MG/ML
20 INJECTION, SOLUTION INTRAVENOUS
OUTPATIENT
Start: 2025-11-03

## 2025-05-12 RX ORDER — ONDANSETRON 2 MG/ML
8 INJECTION INTRAMUSCULAR; INTRAVENOUS
OUTPATIENT
Start: 2025-11-03

## 2025-05-12 RX ORDER — EPINEPHRINE 1 MG/ML
0.3 INJECTION, SOLUTION, CONCENTRATE INTRAVENOUS PRN
OUTPATIENT
Start: 2025-11-03

## 2025-05-12 RX ORDER — ACETAMINOPHEN 325 MG/1
650 TABLET ORAL
Status: DISCONTINUED | OUTPATIENT
Start: 2025-05-12 | End: 2025-05-12 | Stop reason: HOSPADM

## 2025-05-12 RX ORDER — ACETAMINOPHEN 325 MG/1
650 TABLET ORAL
OUTPATIENT
Start: 2025-11-03

## 2025-05-12 RX ORDER — SODIUM CHLORIDE 9 MG/ML
INJECTION, SOLUTION INTRAVENOUS CONTINUOUS
OUTPATIENT
Start: 2025-11-03

## 2025-05-12 RX ORDER — HYDROCORTISONE SODIUM SUCCINATE 100 MG/2ML
100 INJECTION INTRAMUSCULAR; INTRAVENOUS
OUTPATIENT
Start: 2025-11-03

## 2025-05-12 RX ORDER — DIPHENHYDRAMINE HYDROCHLORIDE 50 MG/ML
50 INJECTION, SOLUTION INTRAMUSCULAR; INTRAVENOUS
OUTPATIENT
Start: 2025-11-03

## 2025-05-12 RX ORDER — EPINEPHRINE 1 MG/ML
0.3 INJECTION, SOLUTION, CONCENTRATE INTRAVENOUS PRN
Status: DISCONTINUED | OUTPATIENT
Start: 2025-05-12 | End: 2025-05-12 | Stop reason: HOSPADM

## 2025-05-12 RX ORDER — ALBUTEROL SULFATE 90 UG/1
4 INHALANT RESPIRATORY (INHALATION) PRN
Status: DISCONTINUED | OUTPATIENT
Start: 2025-05-12 | End: 2025-05-12 | Stop reason: HOSPADM

## 2025-05-12 RX ORDER — HYDROCORTISONE SODIUM SUCCINATE 100 MG/2ML
100 INJECTION INTRAMUSCULAR; INTRAVENOUS
Status: DISCONTINUED | OUTPATIENT
Start: 2025-05-12 | End: 2025-05-12 | Stop reason: HOSPADM

## 2025-05-12 RX ORDER — DIPHENHYDRAMINE HYDROCHLORIDE 50 MG/ML
50 INJECTION, SOLUTION INTRAMUSCULAR; INTRAVENOUS
Status: DISCONTINUED | OUTPATIENT
Start: 2025-05-12 | End: 2025-05-12 | Stop reason: HOSPADM

## 2025-05-12 RX ORDER — ONDANSETRON 2 MG/ML
8 INJECTION INTRAMUSCULAR; INTRAVENOUS
Status: DISCONTINUED | OUTPATIENT
Start: 2025-05-12 | End: 2025-05-12 | Stop reason: HOSPADM

## 2025-05-12 RX ORDER — FAMOTIDINE 10 MG/ML
20 INJECTION, SOLUTION INTRAVENOUS
Status: DISCONTINUED | OUTPATIENT
Start: 2025-05-12 | End: 2025-05-12 | Stop reason: HOSPADM

## 2025-05-12 ASSESSMENT — ROUTINE ASSESSMENT OF PATIENT INDEX DATA (RAPID3)
ON A SCALE OF ONE TO TEN, HOW DIFFICULT WAS IT FOR YOU TO COMPLETE THE LISTED DAILY PHYSICAL TASKS OVER THE LAST WEEK: 1.6
WHEN YOU AWAKENED IN THE MORNING OVER THE LAST WEEK, PLEASE INDICATE THE AMOUNT OF TIME IT TAKES UNTIL YOU ARE AS LIMBER AS YOU WILL BE FOR THE DAY: 15 MIN
ON A SCALE OF ONE TO TEN, HOW MUCH OF A PROBLEM HAS UNUSUAL FATIGUE OR TIREDNESS BEEN FOR YOU OVER THE PAST WEEK?: 3
ON A SCALE OF ONE TO TEN, CONSIDERING ALL THE WAYS IN WHICH ILLNESS AND HEALTH CONDITIONS MAY AFFECT YOU AT THIS TIME, PLEASE INDICATE BELOW HOW YOU ARE DOING:: 5
ON A SCALE OF ONE TO TEN, HOW MUCH PAIN HAVE YOU HAD BECAUSE OF YOUR CONDITION OVER THE PAST WEEK?: 5

## 2025-05-12 NOTE — PROGRESS NOTES
Since last visit patient has been feeling: Good     Walking and balance has got worse     Any Rheumatology medication side effects: None        Hospitalizations: None         Infections:  None      Vaccinations: None        5/12/2025     9:00 AM   DMARD/Biologic   AM Stiffness 15 min   Pain 5   Fatigue 3   MDHAQ 1.6   Patient Global Score 5          REVIEW OF SYSTEMS:  A total of 10 systems (musculoskeletal system as stated in the HPI and the following 9 systems) were reviewed with patient today and were negative except as stated in the HPI and except for the following (depicted with an \"X\"):        \"X\" Constitutional  \"X\" HEENT /Mouth  \"X\" Cardiovascular and  Respiratory (2 systems)  \"X\" Gastrointestinal    Fever/chills   Hair loss   Shortness of breath   Upset stomach    Falls   Dry mouth   Coughing   Diarrhea / constipation    Wt loss   Mouth sores   Wheezing   Heartburn    Wt gain   Ringing ears   Chest pain   Dark or bloody stools    Night sweats   Diff. swallowing  X None of above   Nausea or vomiting   X None of above  X None of above     X None of above                \"X\" Integumentary  \"X\" Neurological  \"X\" Genitourinary  \"X\" Psychiatric    Easy bruising   Numbness/ tingling   Female problems   Depression    Rashes   Weakness   Problems with urination   Feeling anxious    Sun sensitivity   Headaches  X None of above   Problems sleeping   X None of above  X None of above     X None of above           
years of age are correlated with Abeta amyloid  pathology as determined by amyloid PET imaging.                                                                   *  Test performed by [employer] Electrochemiluminescence  Immunoassay (ECLIA). Values obtained with different methods  cannot be used interchangeable.  This test was developed and its performance characteristics  determined by [facility]. It has not been cleared or approved  by the Food and Drug Administration.  Performed At:  [facility] [id number]  Abiodun Kulkarni MD Ph:[phone]           The results above were reviewed and discussed with patient.           ASSESSMENT AND PLAN:  Jae Lopez is a 74 y.o. adult that is here for evaluation of:    Jae \"Keira\" was seen today for follow-up.    Diagnoses and all orders for this visit:    Age-related osteoporosis without current pathological fracture    Vitamin D deficiency    Unsteady gait    Osteoporosis-stable without falls or fractures.  She is not following with a dentist but denies any dental pain.  Discussed with family that if she needs any dental tooth removal or implant she should notify our office for appropriate timing of that procedure.  She will receive her second Prolia injection today first injection received without any medication side effects.  Repeat DEXA scan in July 2025.  She will repeat labs 1 month prior to each injection.    She has an unsteady gait due to Alzheimer's dementia-she has not had any falls at home she lives in a 3 story townhouse and is still ascending and descending the stairs on her own.  Discussed with her  that he likely will need to rearrange the house so that she can remain on the first floor to avoid falls in the future.  She is not currently using any assistive devices but he will speak with Dr. Prater her primary care about this at the next follow-up visit.  She would be unable to participate in physical

## 2025-05-12 NOTE — PROGRESS NOTES
[facility]  [address] [place] [address], [address]  [place] [address]  Office : [phone], Fax: [phone]       # 2 Prolia 60mg/ml injected SC today into RLQ abdomen. Patient tolerated injection well. Advised patient to continue with calcium and vitamin D post injection. Advised patient to call with any problems post injection.   Medication ordered by Dr. Graham    Patient Medication Supplied? no  Patient discharged feeling well and instructed to call the office with any issues.    Pre-infusion/injection questionairre for osteoporosis    1. Have you had or are you planning any dental work? No    2. Are you taking your calcium and vitamin D? Yes    3. When was your last osteoporosis treatment? 11/7/24    4. Have you had any recent fractures? No    5. Are you currently in skilled nursing or in patient rehab? No

## 2025-05-12 NOTE — PATIENT INSTRUCTIONS
-Prolia today and every 6 months  - Repeat labs 1 month prior to next Prolia injection  - Schedule bone density scan for July 2025 (quincy gandhi)   - if you dentist they recommend any tooth removal or implant please notify me before scheduling this and also if there is any fracture

## 2025-06-24 NOTE — TELEPHONE ENCOUNTER
Refill requested     Preferred pharmacy: [id number] -  [phone] -  [phone]

## 2025-06-25 RX ORDER — MELOXICAM 7.5 MG/1
7.5 TABLET ORAL DAILY
Qty: 30 TABLET | Refills: 3 | OUTPATIENT
Start: 2025-06-25

## 2025-06-25 RX ORDER — MELOXICAM 7.5 MG/1
7.5 TABLET ORAL DAILY
Qty: 30 TABLET | Refills: 3 | Status: SHIPPED | OUTPATIENT
Start: 2025-06-25

## 2025-07-09 ENCOUNTER — HOSPITAL ENCOUNTER (OUTPATIENT)
Dept: MAMMOGRAPHY | Age: 75
Discharge: HOME | End: 2025-07-12
Attending: INTERNAL MEDICINE
Payer: MEDICARE

## 2025-07-09 DIAGNOSIS — M81.0 AGE-RELATED OSTEOPOROSIS WITHOUT CURRENT PATHOLOGICAL FRACTURE: Chronic | ICD-10-CM

## 2025-07-09 PROCEDURE — 77080 DXA BONE DENSITY AXIAL: CPT

## 2025-08-07 RX ORDER — MELOXICAM 7.5 MG/1
7.5 TABLET ORAL DAILY
Qty: 30 TABLET | Refills: 1 | Status: SHIPPED | OUTPATIENT
Start: 2025-08-07

## 2025-08-19 DIAGNOSIS — E55.9 VITAMIN D DEFICIENCY: ICD-10-CM

## 2025-08-20 RX ORDER — CHOLECALCIFEROL (VITAMIN D3) 50 MCG
2000 TABLET ORAL 2 TIMES DAILY
Qty: 60 TABLET | Refills: 5 | OUTPATIENT
Start: 2025-08-20

## 2025-08-25 DIAGNOSIS — E55.9 VITAMIN D DEFICIENCY: ICD-10-CM

## 2025-08-25 DIAGNOSIS — G30.0 EARLY ONSET ALZHEIMER'S DEMENTIA WITHOUT BEHAVIORAL DISTURBANCE (HCC): ICD-10-CM

## 2025-08-25 DIAGNOSIS — F02.80 EARLY ONSET ALZHEIMER'S DEMENTIA WITHOUT BEHAVIORAL DISTURBANCE (HCC): ICD-10-CM

## 2025-08-25 RX ORDER — MEMANTINE HYDROCHLORIDE 10 MG/1
10 TABLET ORAL 2 TIMES DAILY
Qty: 180 TABLET | Refills: 1 | Status: SHIPPED | OUTPATIENT
Start: 2025-08-25 | End: 2026-02-21

## 2025-08-26 RX ORDER — CHOLECALCIFEROL (VITAMIN D3) 50 MCG
2000 TABLET ORAL 2 TIMES DAILY
Qty: 60 TABLET | Refills: 5 | OUTPATIENT
Start: 2025-08-26

## 2025-08-26 RX ORDER — CHOLECALCIFEROL (VITAMIN D3) 50 MCG
1 TABLET ORAL 2 TIMES DAILY
Qty: 60 TABLET | Refills: 5 | OUTPATIENT
Start: 2025-08-26

## (undated) DEVICE — CONTAINER,SPECIMEN,O.R.STRL,4.5OZ: Brand: MEDLINE

## (undated) DEVICE — PREMIUM WET SKIN PREP TRAY: Brand: MEDLINE INDUSTRIES, INC.

## (undated) DEVICE — SOLUTION IRRIG 1000ML 0.9% SOD CHL USP POUR PLAS BTL

## (undated) DEVICE — APPLIER CLP L9.375IN APER 2.1MM CLS L3.8MM 20 SM TI CLP

## (undated) DEVICE — BLADE ES ELASTOMERIC COAT INSUL DURABLE BEND UPTO 90DEG

## (undated) DEVICE — GARMENT,MEDLINE,DVT,INT,CALF,MED, GEN2: Brand: MEDLINE

## (undated) DEVICE — CORD ES L12FT BPLR FRCP

## (undated) DEVICE — DISPOSABLE STANDARD BIPOLAR FORCEPS, NON-STICK,: Brand: SPETZLER-MALIS

## (undated) DEVICE — ELECTRODE PT RET AD L9FT HI MOIST COND ADH HYDRGEL CORDED

## (undated) DEVICE — DRAPE,INSTRUMENT,MAGNETIC,10X16: Brand: MEDLINE

## (undated) DEVICE — SUTURE VCRL SZ 3-0 L27IN ABSRB UD L26MM SH 1/2 CIR J416H

## (undated) DEVICE — KIT PROCEDURE SURG HEAD AND NECK TOTE

## (undated) DEVICE — SPONGE: SPECIALTY PEANUT XR 100/CS: Brand: MEDICAL ACTION INDUSTRIES

## (undated) DEVICE — DRAPE,TOP,102X53,STERILE: Brand: MEDLINE

## (undated) DEVICE — PROBE 8225101 5PK STD PRASS FL TIP ROHS